# Patient Record
Sex: MALE | Race: BLACK OR AFRICAN AMERICAN | Employment: UNEMPLOYED | URBAN - METROPOLITAN AREA
[De-identification: names, ages, dates, MRNs, and addresses within clinical notes are randomized per-mention and may not be internally consistent; named-entity substitution may affect disease eponyms.]

---

## 2019-01-09 ENCOUNTER — HOSPITAL ENCOUNTER (OUTPATIENT)
Dept: PHYSICAL THERAPY | Age: 59
Discharge: HOME | End: 2019-01-09
Payer: COMMERCIAL

## 2019-01-09 PROCEDURE — 97110 THERAPEUTIC EXERCISES: CPT

## 2019-01-09 PROCEDURE — 97161 PT EVAL LOW COMPLEX 20 MIN: CPT
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
## 2019-01-16 ENCOUNTER — HOSPITAL ENCOUNTER (OUTPATIENT)
Dept: PHYSICAL THERAPY | Age: 59
Discharge: HOME | End: 2019-01-16
Payer: COMMERCIAL

## 2019-01-16 PROCEDURE — 97014 ELECTRIC STIMULATION THERAPY: CPT | Performed by: PHYSICAL THERAPIST

## 2019-01-16 PROCEDURE — 97110 THERAPEUTIC EXERCISES: CPT | Performed by: PHYSICAL THERAPIST

## 2019-01-16 NOTE — PROGRESS NOTES
[facility]  [address], [address] [facility], [place]    OUTPATIENT physical Therapy DAILY Treatment NOTe  Visit: 2    NAME: Christoph Pinto AGE: 66 y.o. GENDER: male  DATE: 1/16/2019  REFERRING PHYSICIAN: Gabriel Crespo MD      GOALS  Short term goals  Time frame: 3 weeks  1. Patient will be compliant and independent with the initial HEP as evidenced by being able to perform without cuing. 2. Patient will report a 50% improvement in symptoms. 3. Patient report a 50% improvement in sleeping. 4. Patient will tolerate 15 minutes of clinic activities before onset of symptoms.      Long term goals  Time frame: 6 weeks  1. Patient will report pain level decrease to 2/10 to allow increased ease of movement. 2. Patient will have an improved score on the TXU Javan outcome measure by 12 points to demonstrate an increase in functional activity tolerance. 3. Patient will be independent in final individualized HEP. 4. Patient will sleep 6-8 hours without being interrupted by pain. SUBJECTIVE:   \"It's so painful. \"    Patient 15 minutes late to session. Pain In: 7-8/10 low back, neck, buttocks    OBJECTIVE DATA SUMMARY:   Objective data from initial evaluation:  Pain:  Location: Low back, neck and left shoulder and buttocks  Quality: aching, burning and throbbing  Now: 7/10  Worst: 10/10  Factors that improve pain: medication: Ibuprofen, Motrin used and beneficial     Posture:   Pt holds himself very stiffly     Strength:   Not tested d/t pain     Range of Motion:   Trunk ROM  Flexion 25% of normal  Extension 25% of normal  SB B/L 10%     Neck ROM  Flexion/Extension 10% of normal  Rotation 10%     Spinal Assessment:   Flattened lumbar spine        Joint Mobility:   Very TTP over spinous processes lumbar spine, B/L PSIS,     Palpation:    Left gluteal muscles. B/L lumbar paraspinals,B/L UT,Rhomboids    Neurologic Assessment:               Tone:  WNL               Sensation: normal Reflexes: not tested     Special Tests:   Not tested     Mobility:               Transitional Movements: Antalgic                Gait: antalgic     Balance: WNL     Functional Measure:   Ros Watsonashia 17/24     TREATMENT/INTERVENTION:  Modalities (Rationale): to decrease pain and muscle guarding  MHP and e stim to bilateral neck and low back for 15 minutes in supine at end of session; no skin irritation noted     Therapeutic Exercises:  HEP: Cx ROM, Shoulder shrug, Flexion stretch over table, KTC, PPT, LTR, shoulder flexion using cane    Clinic exercises in BOLD performed this date:    Shoulder shrugs: 10 reps  Cervical spine AROM: 10 reps  Shoulder flexion with cane in supine  Flexion stretch over table: 5 reps    SKTC: 3 reps  PPT; unable to tolerate  LTR: 10 reps   Piriformis stretch: 2 reps with 30 second holds B; required assistance     Manual Therapy:  none     Neuro Re-Education:  Encouraged to wean for soft cervical collar    Activity tolerance and post treatment pain report:   Poor  Pain Out: 7/10    Education:  Education was provided to the patient on the following topics:.  []    No changes were made to the home exercise program.  [x]    The following changes were made to the home exercise program: Continue to encourage patient to wean for cervical soft collar, continue HEP, and perform self massage in shower to neck musculature  Patient verbalized understanding of the topics presented. ASSESSMENT:   Patient returns following initial evaluation. Patient presents with 7-8/10 pain in low back and neck. He reports pain in bilateral buttocks and brought his own cushion for when he is sitting and laying down. Pain felt more stretch on right with piriformis stretch. Patient arrived to clinic in soft cervical collar again; encouraged patient to wean from wearing this as it can inhibit ROM and prolong pain. Patient very slow and guarded with movements in clinic.  Occasional intense headaches reported. Patient with minimal tolerance of clinic exercises. Limited with adding to program as patient was 15 minutes late to session. Electrical stimulation trial performed to bilateral UT and lumbar paraspinals today; patient with pain relief following e stim. Patients progression toward goals is as follows:  [x]     Improving appropriately and progressing toward goals  []     Improving slowly and progressing toward goals  []     Not making progress toward goals and plan of care will be adjusted    PLAN OF CARE:   Patient continues to benefit from skilled intervention to address the above impairments. [x]    Continue treatment per established plan of care.   []     Recommend the following changes to the plan of care    Recommendations/Intent for next treatment: reassess response to e stim and see if he has come out of soft cervical collar    Mikey Gautam, PT   Time Calculation: 30 mins  Patient Time in clinic:   Start Time: 1345   Stop Time: [id number] [phone]

## 2019-01-22 ENCOUNTER — HOSPITAL ENCOUNTER (OUTPATIENT)
Dept: PHYSICAL THERAPY | Age: 59
Discharge: HOME | End: 2019-01-22
Payer: COMMERCIAL

## 2019-01-22 PROCEDURE — 97110 THERAPEUTIC EXERCISES: CPT

## 2019-01-22 PROCEDURE — 97014 ELECTRIC STIMULATION THERAPY: CPT

## 2019-01-22 NOTE — PROGRESS NOTES
[facility]  [address], [address] [facility]    OUTPATIENT physical Therapy DAILY Treatment NOTe  Visit: 3    NAME: Eliseo Olivares AGE: 66 y.o. GENDER: male  DATE: 1/22/2019  REFERRING PHYSICIAN: Marcia Colon MD      GOALS  Short term goals  Time frame: 3 weeks  1. Patient will be compliant and independent with the initial HEP as evidenced by being able to perform without cuing. 2. Patient will report a 50% improvement in symptoms. 3. Patient report a 50% improvement in sleeping. 4. Patient will tolerate 15 minutes of clinic activities before onset of symptoms.      Long term goals  Time frame: 6 weeks  1. Patient will report pain level decrease to 2/10 to allow increased ease of movement. 2. Patient will have an improved score on the TXU Javan outcome measure by 12 points to demonstrate an increase in functional activity tolerance. 3. Patient will be independent in final individualized HEP. 4. Patient will sleep 6-8 hours without being interrupted by pain. SUBJECTIVE:   \"It's so painful. \"  Pt arrives wearing soft collar  Patient 10  minutes late to session. Pain In: 8/10 low back, neck, buttocks. It depends on my position. .    Eye irritation has been going to Dr. Reyes. that  OBJECTIVE DATA SUMMARY:   Objective data from initial evaluation:  Pain:  Location: Low back, neck and left shoulder and buttocks  Quality: aching, burning and throbbing  Now: 7/10  Worst: 10/10  Factors that improve pain: medication: Ibuprofen, Motrin used and beneficial     Posture:   Pt holds himself very stiffly     Strength:   Not tested d/t pain     Range of Motion:   Trunk ROM  Flexion 25% of normal  Extension 25% of normal  SB B/L 10%     Neck ROM  Flexion/Extension 10% of normal  Rotation 10%     Spinal Assessment:   Flattened lumbar spine        Joint Mobility:   Very TTP over spinous processes lumbar spine, B/L PSIS,     Palpation:    Left gluteal muscles.  B/L lumbar paraspinals,B/L UT,Rhomboids    Neurologic Assessment:               Tone: WNL               Sensation: normal               Reflexes: not tested     Special Tests:   Not tested     Mobility:               Transitional Movements: Antalgic                Gait: antalgic     Balance: WNL     Functional Measure:   TXU Javan 17/24     TREATMENT/INTERVENTION:  Modalities (Rationale): to decrease pain and muscle guarding  MHP and e stim to bilateral neck and low back for 15 minutes in supine at end of session; no skin irritation noted     Therapeutic Exercises:  HEP: Cx ROM, Shoulder shrug, Flexion stretch over table, KTC, PPT, LTR, shoulder flexion using cane    Clinic exercises in BOLD performed this date:    Shoulder shrugs: 10 reps  Cervical spine AROM: 10 reps  Flexion stretch over Blue ball : 5 reps    SKTC: 8 reps  PPT; 1 rep  LTR: 10 reps   Piriformis stretch: 2 reps with 30 second holds B; required assistance    Standing   Rows with red TB x 8 reps  Pull downs with red TB 6 reps    LBE x 2 minutes, very slow rotation, unable to use UBE d/t pain     Manual Therapy:  none     Neuro Re-Education:  Encouraged to wean for soft cervical collar    Activity tolerance and post treatment pain report:   Poor  Pain Out: 7/10    Education:  Education was provided to the patient on the following topics:.  []    No changes were made to the home exercise program.  [x]    The following changes were made to the home exercise program: Continue to encourage patient to wean for cervical soft collar, continue HEP, and perform self massage in shower to neck musculature  Patient verbalized understanding of the topics presented. ASSESSMENT:    Patient presents with 8/10 pain in low back and neck. He reports pain in bilateral buttocks and brought his own cushion for when he is sitting and laying down.   Patient arrived to clinic in soft cervical collar again; encouraged patient to wean from wearing this as it can inhibit ROM and prolong pain. All movements in clinic very slow and guarded, unable to complete exercises, or advance. Pt clinching jaw during all exercises, discussed with pt the damage that can occur to teeth and TMJ with clinching and suggested alternate ways to deal with pain. Electrical stimulation trial performed to bilateral UT and lumbar paraspinals today; patient with pain relief following e stim. Patients progression toward goals is as follows:  [x]     Improving appropriately and progressing toward goals  []     Improving slowly and progressing toward goals  []     Not making progress toward goals and plan of care will be adjusted    PLAN OF CARE:   Patient continues to benefit from skilled intervention to address the above impairments. [x]    Continue treatment per established plan of care. []     Recommend the following changes to the plan of care    Recommendations/Intent for next treatment: reassess response to e stim and see if he has come out of soft cervical collar.   JAYNA Mckee PT   Time Calculation: 50 mins  Patient Time in clinic:   Start Time: 1400   Stop Time: [phone]

## 2019-01-23 ENCOUNTER — HOSPITAL ENCOUNTER (OUTPATIENT)
Dept: PHYSICAL THERAPY | Age: 59
Discharge: HOME | End: 2019-01-23
Payer: COMMERCIAL

## 2019-01-23 PROCEDURE — 97110 THERAPEUTIC EXERCISES: CPT

## 2019-01-23 NOTE — PROGRESS NOTES
[facility]  [address], [address] [facility]    OUTPATIENT physical Therapy DAILY Treatment NOTe  Visit: 4    NAME: Essence Rodgers AGE: 66 y.o. GENDER: male  DATE: 1/23/2019  REFERRING PHYSICIAN: Eileen Brunson MD      GOALS  Short term goals  Time frame: 3 weeks  1. Patient will be compliant and independent with the initial HEP as evidenced by being able to perform without cuing. 2. Patient will report a 50% improvement in symptoms. 3. Patient report a 50% improvement in sleeping. 4. Patient will tolerate 15 minutes of clinic activities before onset of symptoms.      Long term goals  Time frame: 6 weeks  1. Patient will report pain level decrease to 2/10 to allow increased ease of movement. 2. Patient will have an improved score on the TXU Javan outcome measure by 12 points to demonstrate an increase in functional activity tolerance. 3. Patient will be independent in final individualized HEP. 4. Patient will sleep 6-8 hours without being interrupted by pain. SUBJECTIVE:   \"It's so painful. \"  Pt arrives wearing soft collar  Patient 10  minutes late to session. Pain In: 9/10 low back, neck, shoulders,, buttocks. It depends on my position. .    Eye irritation has been going to  [facility]. that  OBJECTIVE DATA SUMMARY:   Objective data from initial evaluation:  Pain:  Location: Low back, neck and left shoulder and buttocks  Quality: aching, burning and throbbing  Now: 7/10  Worst: 10/10  Factors that improve pain: medication: Ibuprofen, Motrin used and beneficial     Posture:   Pt holds himself very stiffly     Strength:   Not tested d/t pain     Range of Motion:   Trunk ROM  Flexion 25% of normal  Extension 25% of normal  SB B/L 10%     Neck ROM  Flexion/Extension 10% of normal  Rotation 10%     Spinal Assessment:   Flattened lumbar spine        Joint Mobility:   Very TTP over spinous processes lumbar spine, B/L PSIS,     Palpation:    Left gluteal muscles.  B/L lumbar paraspinals,B/L UT,Rhomboids    Neurologic Assessment:               Tone: WNL               Sensation: normal               Reflexes: not tested     Special Tests:   Not tested     Mobility:               Transitional Movements: Antalgic                Gait: antalgic     Balance: WNL     Functional Measure:   Janna Mobile      TREATMENT/INTERVENTION:  Modalities (Rationale): to decrease pain and muscle guarding  MHP and e stim to bilateral neck and low back for 15 minutes in supine at end of session; no skin irritation noted     Therapeutic Exercises:  HEP: Cx ROM, Shoulder shrug, Flexion stretch over table, KTC, PPT, LTR, shoulder flexion using cane    Clinic exercises in BOLD performed this date:    Shoulder shrugs: 10 reps  Shoulder blade squeeze 5 reps  Cervical spine AROM: 10 reps  Flexion stretch over Blue ball : 5 reps  SB with arms across chest, vertical towel roll, push into towel with 10 sec hold    SKTC: 8 reps  PPT; 1 rep  LTR: 10 reps   Piriformis stretch: 2 reps with 30 second holds B; required assistance  Adductor stretch 2 reps 15 sec hold    Standing   Rows with red TB x 10 reps  Pull downs with red TB  10 reps    LBE/UBE x 2 minutes, very slow rotation,     Manual Therapy:  none     Neuro Re-Education:  Encouraged to wean for soft cervical collar    Activity tolerance and post treatment pain report:   Poor  Pain Out: 7/10    Education:  Education was provided to the patient on the following topics:.  []    No changes were made to the home exercise program.  [x]    The following changes were made to the home exercise program: Continue to encourage patient to wean for cervical soft collar, continue HEP, and perform self massage in shower to neck musculature  Patient verbalized understanding of the topics presented. ASSESSMENT:    Patient presents with 9/10 pain in low back and neck.  He reports pain in bilateral buttocks and brought his own cushion for when he is sitting and laying down.  Patient arrived to clinic in soft cervical collar again; encouraged patient to wean from wearing this as it can inhibit ROM and prolong pain. All movements in clinic very slow and guarded, unable to complete exercises, or advance. Pt clinching jaw during all exercises, discussed with pt the damage that can occur to teeth and TMJ with clinching and suggested alternate ways to deal with pain. Minimally advanced clinic activities as tolerated    Patients progression toward goals is as follows:  [x]     Improving appropriately and progressing toward goals  []     Improving slowly and progressing toward goals  []     Not making progress toward goals and plan of care will be adjusted    PLAN OF CARE:   Patient continues to benefit from skilled intervention to address the above impairments. [x]    Continue treatment per established plan of care. []     Recommend the following changes to the plan of care    Recommendations/Intent for next treatment:TM, advance as able.     Ivett Addison PT   Time Calculation: 40 mins  Patient Time in clinic:   Start Time: 1400   Stop Time: 1440

## 2019-01-23 NOTE — PROGRESS NOTES
Dear Dr Alcala Began,    Mr Grover Hammans has been for 4 Physical Therapy sessions. His ability to participate has been extremely limited due to reported pain with all movements. We have focused on gentle stretches for his neck and back, and exercises to increase his mobility. We have encouraged him to wean from the soft collar in an effort to encourage a more normal movement pattern and to begin strengthening the cervical muscles. While doing the exercises in the clinic he has been clinching his jaw and grinding his teeth, we have discussed with him the benefit of relaxing his jaw to prevent damage to his teeth and TMJ.       Thank you for this referral,    Rosemarie Burns, PT

## 2019-01-28 ENCOUNTER — HOSPITAL ENCOUNTER (OUTPATIENT)
Dept: PHYSICAL THERAPY | Age: 59
Discharge: HOME | End: 2019-01-28
Payer: COMMERCIAL

## 2019-01-28 PROCEDURE — 97014 ELECTRIC STIMULATION THERAPY: CPT

## 2019-01-28 PROCEDURE — 97110 THERAPEUTIC EXERCISES: CPT

## 2019-01-28 NOTE — PROGRESS NOTES
[facility]  [address], [address]    OUTPATIENT physical Therapy DAILY Treatment NOTe  Visit: 5    NAME: Danilo Arthur AGE: 66 y.o. GENDER: male  DATE: 1/28/2019  REFERRING PHYSICIAN: Lorena Velasquez MD      GOALS  Short term goals  Time frame: 3 weeks  1. Patient will be compliant and independent with the initial HEP as evidenced by being able to perform without cuing. 2. Patient will report a 50% improvement in symptoms. 3. Patient report a 50% improvement in sleeping. 4. Patient will tolerate 15 minutes of clinic activities before onset of symptoms.      Long term goals  Time frame: 6 weeks  1. Patient will report pain level decrease to 2/10 to allow increased ease of movement. 2. Patient will have an improved score on the TXU Javan outcome measure by 12 points to demonstrate an increase in functional activity tolerance. 3. Patient will be independent in final individualized HEP. 4. Patient will sleep 6-8 hours without being interrupted by pain. SUBJECTIVE:   Pt saw Dr Rodriguez Mcclellan, he prescribed Percocet. I think the med helps me be able to do the exercise    Took 1/2 pill prior to Therapy today. OBJECTIVE DATA SUMMARY:   Objective data from initial evaluation:  Pain:  Location: Low back, neck and left shoulder and buttocks  Quality: aching, burning and throbbing  Now: 7/10  Worst: 10/10  Factors that improve pain: medication: Ibuprofen, Motrin used and beneficial     Posture:   Pt holds himself very stiffly     Strength:   Not tested d/t pain     Range of Motion:   Trunk ROM  Flexion 25% of normal  Extension 25% of normal  SB B/L 10%     Neck ROM  Flexion/Extension 10% of normal  Rotation 10%     Spinal Assessment:   Flattened lumbar spine        Joint Mobility:   Very TTP over spinous processes lumbar spine, B/L PSIS,     Palpation:    Left gluteal muscles. B/L lumbar paraspinals,B/L UT,Rhomboids    Neurologic Assessment:               Tone:  WNL Sensation: normal               Reflexes: not tested     Special Tests:   Not tested     Mobility:               Transitional Movements: Antalgic                Gait: antalgic     Balance: WNL     Functional Measure:   Wendy Alaniz 17/24     TREATMENT/INTERVENTION:  Modalities (Rationale): to decrease pain and muscle guarding  MHP and e stim to bilateral neck and low back for 15 minutes in supine at end of session; no skin irritation noted     Therapeutic Exercises:  HEP: Cx ROM, Shoulder shrug, Flexion stretch over table, KTC, PPT, LTR, shoulder flexion using cane    Clinic exercises in BOLD performed this date:    Shoulder shrugs: 10 reps  Shoulder blade squeeze 5 reps  Cervical spine AROM: 10 reps  Flexion stretch over Blue ball : 5 reps  SB with arms across chest, vertical towel roll, push into towel with 10 sec hold    SKTC: 8 reps  PPT; 1 rep  LTR: 10 reps   Piriformis stretch: 2 reps with 30 second holds B; required assistance  Adductor stretch 2 reps 15 sec hold  Passive HS stretch 3 reps with 15 sec hold. Standing   Rows with red TB x 10 reps  Pull downs with red TB  10 reps  Hip abduction and extension 7 reps each direction B/L    LBE/UBE x 2 minutes, very slow rotation,      Manual Therapy:  Grade 1 IASTM to B/L UT, levator. Procedure explained to pt and he expressed understanding.     Neuro Re-Education:  Encouraged to wean for soft cervical collar    Activity tolerance and post treatment pain report:   Poor  Pain Out: 7/10    Education:  Education was provided to the patient on the following topics:.  []    No changes were made to the home exercise program.  [x]    The following changes were made to the home exercise program: Continue to encourage patient to wean for cervical soft collar, continue HEP, and perform self massage in shower to neck musculature  Patient verbalized understanding of the topics presented.       ASSESSMENT:    Patient presents with 9/10 pain in low back and neck. Pt saw Dr Manjit Macias on Thursday, started on Percocet. Pt took 1/2 pill prior to Therapy this date, minimum improvement in abilty to perform exercises. He reports pain in bilateral buttocks and brought his own cushion for when he is sitting and laying down. Patient arrived to clinic in soft cervical collar again; encouraged patient to wean from wearing this as it can inhibit ROM and prolong pain. All movements in clinic very slow and guarded, advanced minimally as above. Patients progression toward goals is as follows:  [x]     Improving appropriately and progressing toward goals  []     Improving slowly and progressing toward goals  []     Not making progress toward goals and plan of care will be adjusted    PLAN OF CARE:   Patient continues to benefit from skilled intervention to address the above impairments. [x]    Continue treatment per established plan of care.   []     Recommend the following changes to the plan of care    Recommendations/Intent for next treatment:TM, advance as able, assess response to IASTM    Pa Rivera PT   Time Calculation: 55 mins  Patient Time in clinic:   Start Time: 1325   Stop Time: [id number]

## 2019-01-30 ENCOUNTER — HOSPITAL ENCOUNTER (OUTPATIENT)
Dept: PHYSICAL THERAPY | Age: 59
Discharge: HOME | End: 2019-01-30
Payer: COMMERCIAL

## 2019-01-30 PROCEDURE — 97014 ELECTRIC STIMULATION THERAPY: CPT

## 2019-01-30 PROCEDURE — 97110 THERAPEUTIC EXERCISES: CPT

## 2019-01-30 NOTE — PROGRESS NOTES
[facility]  [address], [address] [facility]    OUTPATIENT physical Therapy DAILY Treatment NOTe  Visit: 6    NAME: Bren Villa AGE: 66 y.o. GENDER: male  DATE: 1/30/2019  REFERRING PHYSICIAN: Karine Dumont MD      GOALS  Short term goals  Time frame: 3 weeks  1. Patient will be compliant and independent with the initial HEP as evidenced by being able to perform without cuing. 2. Patient will report a 50% improvement in symptoms. 3. Patient report a 50% improvement in sleeping. 4. Patient will tolerate 15 minutes of clinic activities before onset of symptoms.      Long term goals  Time frame: 6 weeks  1. Patient will report pain level decrease to 2/10 to allow increased ease of movement. 2. Patient will have an improved score on the TXU Javan outcome measure by 12 points to demonstrate an increase in functional activity tolerance. 3. Patient will be independent in final individualized HEP. 4. Patient will sleep 6-8 hours without being interrupted by pain. SUBJECTIVE:   Pt arrives wearing collar. 20 minutes late for Apt. Took 2 percocet this date. OBJECTIVE DATA SUMMARY:   Objective data from initial evaluation:  Pain:  Location: Low back, neck and left shoulder and buttocks  Quality: aching, burning and throbbing  Now: 7/10  Worst: 10/10  Factors that improve pain: medication: Ibuprofen, Motrin used and beneficial     Posture:   Pt holds himself very stiffly     Strength:   Not tested d/t pain     Range of Motion:   Trunk ROM  Flexion 25% of normal  Extension 25% of normal  SB B/L 10%     Neck ROM  Flexion/Extension 10% of normal  Rotation 10%     Spinal Assessment:   Flattened lumbar spine        Joint Mobility:   Very TTP over spinous processes lumbar spine, B/L PSIS,     Palpation:    Left gluteal muscles. B/L lumbar paraspinals,B/L UT,Rhomboids    Neurologic Assessment:               Tone:  WNL               Sensation: normal               Reflexes: not tested     Special Tests:   Not tested     Mobility:               Transitional Movements: Antalgic                Gait: antalgic     Balance: WNL     Functional Measure:   Reshma Puentes 17/24     TREATMENT/INTERVENTION:  Modalities (Rationale): to decrease pain and muscle guarding  MHP and e stim to bilateral neck and low back for 15 minutes in supine at end of session; no skin irritation noted     Therapeutic Exercises:  HEP: Cx ROM, Shoulder shrug, Flexion stretch over table, KTC, PPT, LTR, shoulder flexion using cane    Clinic exercises in BOLD performed this date:    Shoulder shrugs: 10 reps  Shoulder blade squeeze 5 reps  Cervical spine AROM: 10 reps  Flexion stretch over Blue ball : 5 reps  SB with arms across chest, vertical towel roll, push into towel with 10 sec hold    SKTC: 8 reps  PPT; 1 rep  LTR: 10 reps   Piriformis stretch: 2 reps with 30 second holds B; required assistance  Adductor stretch 2 reps 15 sec hold  Passive HS stretch 3 reps with 15 sec hold. Standing   Rows with red TB x 10 reps  Pull downs with red TB  10 reps  Hip abduction and extension 7 reps each direction B/L    LBE/UBE x 3 minutes, very slow rotation,      Manual Therapy:  Grade 1 IASTM to B/L UT, levator. Procedure explained to pt and he expressed understanding.     Neuro Re-Education:  Encouraged to wean for soft cervical collar    Activity tolerance and post treatment pain report:   Poor  Pain Out: 7/10    Education:  Education was provided to the patient on the following topics:.  []    No changes were made to the home exercise program.  [x]    The following changes were made to the home exercise program: Continue to encourage patient to wean for cervical soft collar, continue HEP, and perform self massage in shower to neck musculature  Patient verbalized understanding of the topics presented. ASSESSMENT:   Pt 20 minutes late for apt, therefore limited time for treatment session. Sobeida Rosario   He reports that he took 2 percocets prior to coming to Therapy and feels like he will be able to participate. Patient arrived to clinic in soft cervical collar again; encouraged patient to wean from wearing this as it can inhibit ROM and prolong pain. All movements in clinic very slow and guarded, advanced as able. Patients progression toward goals is as follows:  [x]     Improving appropriately and progressing toward goals  []     Improving slowly and progressing toward goals  []     Not making progress toward goals and plan of care will be adjusted    PLAN OF CARE:   Patient continues to benefit from skilled intervention to address the above impairments. [x]    Continue treatment per established plan of care.   []     Recommend the following changes to the plan of care    Recommendations/Intent for next treatment:TM, advance as able, IASTM    Carlo Ramos, PT   Time Calculation: 30 mins  Patient Time in clinic:   Start Time: 2201   Stop Time: 0156

## 2019-02-05 ENCOUNTER — HOSPITAL ENCOUNTER (OUTPATIENT)
Dept: PHYSICAL THERAPY | Age: 59
Discharge: HOME | End: 2019-02-05
Payer: COMMERCIAL

## 2019-02-05 PROCEDURE — 97110 THERAPEUTIC EXERCISES: CPT

## 2019-02-05 PROCEDURE — 97014 ELECTRIC STIMULATION THERAPY: CPT

## 2019-02-05 NOTE — PROGRESS NOTES
[facility]  [address], [address] [facility]    OUTPATIENT physical Therapy DAILY Treatment NOTe  Visit: 7    NAME: Carmela Leon AGE: 66 y.o. GENDER: male  DATE: 2/5/2019  REFERRING PHYSICIAN: Christiane Shanks MD      GOALS  Short term goals  Time frame: 3 weeks  1. Patient will be compliant and independent with the initial HEP as evidenced by being able to perform without cuing. 2. Patient will report a 50% improvement in symptoms. 3. Patient report a 50% improvement in sleeping. 4. Patient will tolerate 15 minutes of clinic activities before onset of symptoms.      Long term goals  Time frame: 6 weeks  1. Patient will report pain level decrease to 2/10 to allow increased ease of movement. 2. Patient will have an improved score on the TXU Javan outcome measure by 12 points to demonstrate an increase in functional activity tolerance. 3. Patient will be independent in final individualized HEP. 4. Patient will sleep 6-8 hours without being interrupted by pain. SUBJECTIVE:   Pt arrives wearing collar. My neck has been hurting for a few days, I took 3 percocets today    Neck 9.5/10  Back 8/10    OBJECTIVE DATA SUMMARY:   Objective data from initial evaluation:  Pain:  Location: Low back, neck and left shoulder and buttocks  Quality: aching, burning and throbbing  Now: 7/10  Worst: 10/10  Factors that improve pain: medication: Ibuprofen, Motrin used and beneficial     Posture:   Pt holds himself very stiffly     Strength:   Not tested d/t pain     Range of Motion:   Trunk ROM  Flexion 25% of normal  Extension 25% of normal  SB B/L 10%     Neck ROM  Flexion/Extension 10% of normal  Rotation 10%     Spinal Assessment:   Flattened lumbar spine        Joint Mobility:   Very TTP over spinous processes lumbar spine, B/L PSIS,     Palpation:    Left gluteal muscles. B/L lumbar paraspinals,B/L UT,Rhomboids    Neurologic Assessment:               Tone:  WNL Sensation: normal               Reflexes: not tested     Special Tests:   Not tested     Mobility:               Transitional Movements: Antalgic                Gait: antalgic     Balance: WNL     Functional Measure:   Amanda Bronson 17/24     TREATMENT/INTERVENTION:  Modalities (Rationale): to decrease pain and muscle guarding  MHP and e stim to bilateral neck and low back for 15 minutes in supine at end of session; no skin irritation noted     Therapeutic Exercises:  HEP: Cx ROM, Shoulder shrug, Flexion stretch over table, KTC, PPT, LTR, shoulder flexion using cane    Clinic exercises in BOLD performed this date:    Shoulder shrugs: 10 reps  Shoulder blade squeeze 5 reps  Cervical spine AROM: 10 reps  Flexion stretch over Blue ball : 5 reps  SB with arms across chest, vertical towel roll, push into towel with 10 sec hold    SKTC: 8 reps  PPT; 1 rep  LTR: 10 reps   Piriformis stretch: 2 reps with 30 second holds B; required assistance  Adductor stretch 2 reps 15 sec hold  Passive HS stretch 3 reps with 15 sec hold. G-H Flexion cane x 5 reps    Standing   Rows with red TB x 10 reps  Pull downs with red TB  10 reps  Hip abduction and extension 7 reps each direction B/L    LBE/UBE x 3 minutes, very slow rotation,   TM x 2 min speed . 2     Manual Therapy:  Grade 1 IASTM to B/L UT, levator. Procedure explained to pt and he expressed understanding.     Neuro Re-Education:  Encouraged to wean for soft cervical collar    Activity tolerance and post treatment pain report:   Poor  Pain Out: 7/10    Education:  Education was provided to the patient on the following topics:.  []    No changes were made to the home exercise program.  [x]    The following changes were made to the home exercise program: Continue to encourage patient to wean for cervical soft collar, continue HEP, and perform self massage in shower to neck musculature  Patient verbalized understanding of the topics presented.       ASSESSMENT:    Patient arrived to clinic in soft cervical collar again; encouraged patient to wean from wearing this as it can inhibit ROM and prolong pain. Pt reports that taking the percocet has decreased his pain but continues with very slow guarded movements in clinic. Advance activities and soft tissue treatment as able. Patients progression toward goals is as follows:  [x]     Improving appropriately and progressing toward goals  []     Improving slowly and progressing toward goals  []     Not making progress toward goals and plan of care will be adjusted    PLAN OF CARE:   Patient continues to benefit from skilled intervention to address the above impairments. [x]    Continue treatment per established plan of care.   []     Recommend the following changes to the plan of care    Recommendations/Intent for next treatment:TM, advance as able, [facility]    Pa Rivera, PT   Time Calculation: 50 mins  Patient Time in clinic:   Start Time: 1430   Stop Time: [phone]

## 2019-02-07 ENCOUNTER — HOSPITAL ENCOUNTER (OUTPATIENT)
Dept: PHYSICAL THERAPY | Age: 59
Discharge: HOME | End: 2019-02-07
Payer: COMMERCIAL

## 2019-02-07 PROCEDURE — 97014 ELECTRIC STIMULATION THERAPY: CPT | Performed by: PHYSICAL THERAPIST

## 2019-02-07 PROCEDURE — 97110 THERAPEUTIC EXERCISES: CPT | Performed by: PHYSICAL THERAPIST

## 2019-02-07 NOTE — PROGRESS NOTES
[facility]  [address], [address] [facility]    OUTPATIENT physical Therapy DAILY Treatment NOTe  Visit: 8    NAME: Rosaline Ledesma AGE: 66 y.o. GENDER: male  DATE: 2/7/2019  REFERRING PHYSICIAN: Carol Ann Mac MD      GOALS  Short term goals  Time frame: 3 weeks  1. Patient will be compliant and independent with the initial HEP as evidenced by being able to perform without cuing. 2. Patient will report a 50% improvement in symptoms. 3. Patient report a 50% improvement in sleeping. 4. Patient will tolerate 15 minutes of clinic activities before onset of symptoms.      Long term goals  Time frame: 6 weeks  1. Patient will report pain level decrease to 2/10 to allow increased ease of movement. 2. Patient will have an improved score on the TXU Javan outcome measure by 12 points to demonstrate an increase in functional activity tolerance. 3. Patient will be independent in final individualized HEP. 4. Patient will sleep 6-8 hours without being interrupted by pain. SUBJECTIVE:   \"I feel better today. \"    Patient continues to arrive wearing soft cervical collar. Pain in: Neck 7.5/10; Back 7/10    OBJECTIVE DATA SUMMARY:   Objective data from initial evaluation:  Pain:  Location: Low back, neck and left shoulder and buttocks  Quality: aching, burning and throbbing  Now: 7/10  Worst: 10/10  Factors that improve pain: medication: Ibuprofen, Motrin used and beneficial     Posture:   Pt holds himself very stiffly     Strength:   Not tested d/t pain     Range of Motion:   Trunk ROM  Flexion 25% of normal  Extension 25% of normal  SB B/L 10%     Neck ROM  Flexion/Extension 10% of normal  Rotation 10%     Spinal Assessment:   Flattened lumbar spine        Joint Mobility:   Very TTP over spinous processes lumbar spine, B/L PSIS,     Palpation:    Left gluteal muscles. B/L lumbar paraspinals,B/L UT,Rhomboids    Neurologic Assessment:               Tone:  WNL Sensation: normal               Reflexes: not tested     Special Tests:   Not tested     Mobility:               Transitional Movements: Antalgic                Gait: antalgic     Balance: WNL     Functional Measure:   TXU Javan 17/24     TREATMENT/INTERVENTION:  Modalities (Rationale): to decrease pain and muscle guarding  MHP and e stim to bilateral neck and low back for 15 minutes in prone at end of session; no skin irritation noted     Therapeutic Exercises:  HEP: Cx ROM, Shoulder shrug, Flexion stretch over table, KTC, PPT, LTR, shoulder flexion using cane    Clinic exercises in BOLD performed this date:    Shoulder shrugs: 10 reps  Shoulder blade squeeze 5 reps  Cervical spine AROM rotation and side bend: 10 reps  Flexion stretch over Blue ball : 2 sets of 5 reps  Seated trunk rotation with contralateral cervical rotation: 5 reps each side  SB with arms across chest, vertical towel roll, push into towel with 10 sec hold    SKTC: 5 reps B  PPT: 10 rep  LTR: 10 reps   Piriformis stretch: 2 reps with 30 second holds B; required assistance  Adductor stretch 2 reps 15 sec hold  Passive HS stretch 3 reps with 15 sec hold. G-H Flexion cane x 5 reps    Standing   Rows with red TB x 10 reps  Pull downs with red TB  10 reps  Hip abduction and extension 7 reps each direction B/L    LBE/UBE x 3 minutes, very slow rotation,   TM x 2 min speed . 2     Manual Therapy:  Grade 1 IASTM to B/L UT, levator. Procedure explained to pt and he expressed understanding.   Gentle STM to bilateral UT      Neuro Re-Education:  Encouraged to wean for soft cervical collar    Activity tolerance and post treatment pain report:   Fair/Poor  Pain Out: 6/10    Education:  Education was provided to the patient on the following topics:.  []    No changes were made to the home exercise program.  [x]    The following changes were made to the home exercise program: Continue to encourage patient to wean for cervical soft collar, continue HEP, and perform self massage in shower to neck musculature  Patient verbalized understanding of the topics presented. ASSESSMENT:   Patient presents with decreased pain in low back and neck from previous session. Patient continues to arrive to clinic in soft cervical collar. Educated on importance of weaning from soft collar as it can inhibit ROM and prolong pain. Patient continues to be hypersensitive to palpation at bilateral UT, levator scapulae, and rhomboids. He continues to be guarded with his movements in clinic. Increased rest break required throughout session. Advance activities and soft tissue treatment as able. He reports pain relief following e stim. Patients progression toward goals is as follows:  [x]     Improving appropriately and progressing toward goals  []     Improving slowly and progressing toward goals  []     Not making progress toward goals and plan of care will be adjusted    PLAN OF CARE:   Patient continues to benefit from skilled intervention to address the above impairments. [x]    Continue treatment per established plan of care.   []     Recommend the following changes to the plan of care    Recommendations/Intent for next treatment:TM, advance as able, IASTM    Chandler Ghotra, ALBA   Time Calculation: 45 mins  Patient Time in clinic:   Start Time: 9006   Stop Time: 1350

## 2019-02-12 ENCOUNTER — HOSPITAL ENCOUNTER (OUTPATIENT)
Dept: PHYSICAL THERAPY | Age: 59
Discharge: HOME | End: 2019-02-12
Payer: COMMERCIAL

## 2019-02-12 PROCEDURE — 97014 ELECTRIC STIMULATION THERAPY: CPT

## 2019-02-12 PROCEDURE — 97110 THERAPEUTIC EXERCISES: CPT
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
## 2019-02-19 ENCOUNTER — HOSPITAL ENCOUNTER (OUTPATIENT)
Dept: PHYSICAL THERAPY | Age: 59
Discharge: HOME | End: 2019-02-19
Payer: COMMERCIAL

## 2019-02-19 PROCEDURE — 97110 THERAPEUTIC EXERCISES: CPT

## 2019-02-19 PROCEDURE — 97014 ELECTRIC STIMULATION THERAPY: CPT

## 2019-02-19 NOTE — PROGRESS NOTES
[facility]  [address], [address] [facility]    OUTPATIENT physical Therapy DAILY Treatment NOTe  Visit: 10    NAME: Domenico Fine AGE: 66 y.o. GENDER: male  DATE: 2/19/2019  REFERRING PHYSICIAN: Allen Walton MD      GOALS  Short term goals  Time frame: 3 weeks  1. Patient will be compliant and independent with the initial HEP as evidenced by being able to perform without cuing. 2. Patient will report a 50% improvement in symptoms. 3. Patient report a 50% improvement in sleeping. 4. Patient will tolerate 15 minutes of clinic activities before onset of symptoms.      Long term goals  Time frame: 6 weeks  1. Patient will report pain level decrease to 2/10 to allow increased ease of movement. 2. Patient will have an improved score on the TXU Javan outcome measure by 12 points to demonstrate an increase in functional activity tolerance. 3. Patient will be independent in final individualized HEP. 4. Patient will sleep 6-8 hours without being interrupted by pain. SUBJECTIVE:   \"It is a little better. My fiance got me to ride the bike at home\"    Patient continues to arrive wearing soft cervical collar. Pain in: Neck 5/10; Back 5/10    OBJECTIVE DATA SUMMARY:   Objective data from initial evaluation:  Pain:  Location: Low back, neck and left shoulder and buttocks  Quality: aching, burning and throbbing  Now: 7/10  Worst: 10/10  Factors that improve pain: medication: Ibuprofen, Motrin used and beneficial     Posture:   Pt holds himself very stiffly     Strength:   Not tested d/t pain     Range of Motion:   Trunk ROM  Flexion 25% of normal  Extension 25% of normal  SB B/L 10%     Neck ROM  Flexion/Extension 10% of normal  Rotation 10%     Spinal Assessment:   Flattened lumbar spine        Joint Mobility:   Very TTP over spinous processes lumbar spine, B/L PSIS,     Palpation:    Left gluteal muscles.  B/L lumbar paraspinals,B/L UT,Rhomboids    Neurologic Assessment: Tone: WNL               Sensation: normal               Reflexes: not tested     Special Tests:   Not tested     Mobility:               Transitional Movements: Antalgic                Gait: antalgic     Balance: WNL     Functional Measure:   Martine Rose 17/24 2/19/2019 terence Srinivasan 18/24     TREATMENT/INTERVENTION:  Modalities (Rationale): to decrease pain and muscle guarding  MHP and e stim to bilateral neck and low back for 15 minutes in prone at end of session; no skin irritation noted     Therapeutic Exercises:  HEP: Cx ROM, Shoulder shrug, Flexion stretch over table, KTC, PPT, LTR, shoulder flexion using cane    Clinic exercises in BOLD performed this date:    Shoulder shrugs: 10 reps  Shoulder blade squeeze 5 reps  Cervical spine AROM rotation and side bend: 10 reps  Flexion stretch over Blue ball : 2 sets of 5 reps  Seated trunk rotation with contralateral cervical rotation: 5 reps each side  Seated trunk extension x 5 reps  SB with arms across chest, vertical towel roll, push into towel with 10 sec hold  Seated HS stretch 2 reps 30 sec hold    SKTC: 7 reps B  Bridges x 7 reps  LTR: 10 reps   Piriformis stretch: 2 reps with 30 second holds B; required assistance  Adductor stretch 2 reps 15 sec hold  Passive HS stretch 3 reps with 15 sec hold. G-H Flexion cane x 5 reps    Standing   Rows with blue  TB x 10 reps  Pull downs with blue TB  10 reps  Hip abduction and extension 10 reps each direction B/L    LBE/UBE x 2 minute forward, very slow rotation, 1 minute reverse  TM x 2 min speed . 2     Manual Therapy:  Grade 1 IASTM to B/L UT, levator, low tolerance  Procedure explained to pt and he expressed understanding.   Gentle STM to bilateral UT   STM lumbar paraspinals and left gluteal muscles using green weighted ball, very low tolerance     Neuro Re-Education:  Encouraged to wean for soft cervical collar    Activity tolerance and post treatment pain report:   Fair/Poor  Pain Out: 6/10    Education:  Education was provided to the patient on the following topics:.  []    No changes were made to the home exercise program.  [x]    The following changes were made to the home exercise program: Continue to encourage patient to wean for cervical soft collar, continue HEP, and perform self massage in shower to neck musculature  Patient verbalized understanding of the topics presented. ASSESSMENT:   Patient presents with decreased pain in low back and neck from previous session. 5/10 pain. Patient continues to arrive to clinic in soft cervical collar, continue to suggest that he wean from collar to allow improved ROM. Patient continues to be sensitive to palpation. IASTM trial this date assess response. Advance activities and soft tissue treatment as able. He reports pain relief following e stim. Patients progression toward goals is as follows:  [x]     Improving appropriately and progressing toward goals  []     Improving slowly and progressing toward goals  []     Not making progress toward goals and plan of care will be adjusted    PLAN OF CARE:   Patient continues to benefit from skilled intervention to address the above impairments. [x]    Continue treatment per established plan of care.   []     Recommend the following changes to the plan of care    Recommendations/Intent for next treatment:JAYNA, advance as able, IASJAYNA,    Alexander Disla, PT   Time Calculation: 55 mins  Patient Time in clinic:   Start Time: 1005   Stop Time: 1100

## 2019-02-21 ENCOUNTER — HOSPITAL ENCOUNTER (OUTPATIENT)
Dept: PHYSICAL THERAPY | Age: 59
Discharge: HOME | End: 2019-02-21
Payer: COMMERCIAL

## 2019-02-21 PROCEDURE — 97110 THERAPEUTIC EXERCISES: CPT | Performed by: PHYSICAL THERAPIST

## 2019-02-21 PROCEDURE — 97014 ELECTRIC STIMULATION THERAPY: CPT | Performed by: PHYSICAL THERAPIST

## 2019-02-21 NOTE — PROGRESS NOTES
[facility]  [address], [address] [facility]    OUTPATIENT physical Therapy DAILY Treatment NOTe  Visit: 11    NAME: Maty Ramos AGE: 66 y.o. GENDER: male  DATE: 2/21/2019  REFERRING PHYSICIAN: Gisele Wheat MD      GOALS  Short term goals  Time frame: 3 weeks  1. Patient will be compliant and independent with the initial HEP as evidenced by being able to perform without cuing. MET  2. Patient will report a 50% improvement in symptoms. NOT MET  3. Patient report a 50% improvement in sleeping. MET  4. Patient will tolerate 15 minutes of clinic activities before onset of symptoms. NOT MET     Long term goals  Time frame: 6 weeks  1. Patient will report pain level decrease to 2/10 to allow increased ease of movement. 2. Patient will have an improved score on the TXU Javan outcome measure by 12 points to demonstrate an increase in functional activity tolerance. 3. Patient will be independent in final individualized HEP. 4. Patient will sleep 6-8 hours without being interrupted by pain. SUBJECTIVE:   \"It's still sore\"    Patient continues to arrive wearing soft cervical collar. Pain in: Neck 5/10; Back 5/10    OBJECTIVE DATA SUMMARY:   Objective data from initial evaluation:  Pain:  Location: Low back, neck and left shoulder and buttocks  Quality: aching, burning and throbbing  Now: 7/10  Worst: 10/10  Factors that improve pain: medication: Ibuprofen, Motrin used and beneficial     Posture:   Pt holds himself very stiffly     Strength:   Not tested d/t pain     Range of Motion:   Trunk ROM  Flexion 25% of normal  Extension 25% of normal  SB B/L 10%     Neck ROM  Flexion/Extension 10% of normal  Rotation 10%     Spinal Assessment:   Flattened lumbar spine        Joint Mobility:   Very TTP over spinous processes lumbar spine, B/L PSIS,     Palpation:    Left gluteal muscles. B/L lumbar paraspinals,B/L UT,Rhomboids    Neurologic Assessment:               Tone:  WNL Sensation: normal               Reflexes: not tested     Special Tests:   Not tested     Mobility:               Transitional Movements: Antalgic                Gait: antalgic     Balance: WNL     Functional Measure:   Jorje Burkitt 17/24 2/19/2019 linda Srinivasan 18/24     TREATMENT/INTERVENTION:  Modalities (Rationale): to decrease pain and muscle guarding  MHP and e stim to bilateral neck and low back for 15 minutes in supine at end of session; no skin irritation noted     Therapeutic Exercises:  HEP: Cx ROM, Shoulder shrug, Flexion stretch over table, KTC, PPT, LTR, shoulder flexion using cane    Clinic exercises in BOLD performed this date:    Shoulder shrugs: 10 reps  Shoulder blade squeeze: 5 reps  Cervical spine AROM rotation and side bend: 10 reps  Flexion stretch over physioball: 10 reps  Seated trunk rotation: 5 reps each side  Seated trunk extension x 5 reps  SB with arms across chest, vertical towel roll, push into towel with 10 sec hold  Seated HS stretch 2 reps 30 sec hold    SKTC: 7 reps B  Bridges: 2 sets of 5 reps  LTR: 10 reps   Piriformis stretch with towel: 2 reps with 30 second holds B; required assistance   Adductor stretch 2 reps 15 sec hold  Passive HS stretch 3 reps with 15 sec hold. G-H Flexion cane x 5 reps    Standing   Rows with blue  TB x 10 reps  Pull downs with blue TB  10 reps  Hip abduction and extension 10 reps each direction B/L    LBE/UBE x 2 minute forward, very slow rotation, 1 minute reverse  TM x 2 min speed . 2     Manual Therapy:  Grade 1 IASTM to B/L UT, levator, low tolerance  Procedure explained to pt and he expressed understanding.   Gentle STM to bilateral UT   STM lumbar paraspinals and left gluteal muscles using green weighted ball, very low tolerance     Neuro Re-Education:  Encouraged to wean for soft cervical collar    Activity tolerance and post treatment pain report:   Fair/Poor; low tolerance to stretches and exercises  Pain Out: 4/10    Education:  Education was provided to the patient on the following topics:.  []    No changes were made to the home exercise program.  [x]    The following changes were made to the home exercise program: Continue to encourage patient to wean for cervical soft collar, continue HEP, and perform self massage in shower to neck musculature  Patient verbalized understanding of the topics presented. ASSESSMENT:   Patient presents with 5/10 pain in neck and low back. Patient continues to arrive to clinic in soft cervical collar despite encouragement to wean from collar altogether. Patient verbalizes his understanding of education that collar inhibits mobility which prolongs pain, but continues to wear it. He remains very cautious and slow with his movements. He continues to require verbal cues to increase hold with stretches. Patient continues to be sensitive to palpation. Advance activities and soft tissue treatment as able. He reports pain relief following e stim. Patients progression toward goals is as follows:  [x]     Improving appropriately and progressing toward goals  []     Improving slowly and progressing toward goals  []     Not making progress toward goals and plan of care will be adjusted    PLAN OF CARE:   Patient continues to benefit from skilled intervention to address the above impairments. [x]    Continue treatment per established plan of care.   []     Recommend the following changes to the plan of care    Recommendations/Intent for next treatment:COLIN DORANTES, PT   Time Calculation: 40 mins  Patient Time in clinic:   Start Time: [address]   Stop Time: 21

## 2019-02-24 ENCOUNTER — HOSPITAL ENCOUNTER (EMERGENCY)
Age: 59
Discharge: HOME | End: 2019-02-24
Attending: EMERGENCY MEDICINE | Admitting: EMERGENCY MEDICINE
Payer: COMMERCIAL

## 2019-02-24 VITALS
DIASTOLIC BLOOD PRESSURE: 77 MMHG | BODY MASS INDEX: 27.06 KG/M2 | OXYGEN SATURATION: 95 % | RESPIRATION RATE: 18 BRPM | WEIGHT: 178 LBS | HEART RATE: 72 BPM | SYSTOLIC BLOOD PRESSURE: 137 MMHG

## 2019-02-24 DIAGNOSIS — H10.31 ACUTE CONJUNCTIVITIS OF RIGHT EYE, UNSPECIFIED ACUTE CONJUNCTIVITIS TYPE: Primary | ICD-10-CM

## 2019-02-24 PROCEDURE — 74011250637 HC RX REV CODE- 250/637: Performed by: NURSE PRACTITIONER

## 2019-02-24 PROCEDURE — 99283 EMERGENCY DEPT VISIT LOW MDM: CPT

## 2019-02-24 RX ORDER — NEOMYCIN SULFATE, POLYMYXIN B SULFATE AND DEXAMETHASONE 3.5; 10000; 1 MG/ML; [USP'U]/ML; MG/ML
1 SUSPENSION/ DROPS OPHTHALMIC 4 TIMES DAILY
COMMUNITY
End: 2019-02-24

## 2019-02-24 RX ORDER — NEOMYCIN SULFATE, POLYMYXIN B SULFATE AND DEXAMETHASONE 3.5; 10000; 1 MG/ML; [USP'U]/ML; MG/ML
1 SUSPENSION/ DROPS OPHTHALMIC 2 TIMES DAILY
Status: DISCONTINUED | OUTPATIENT
Start: 2019-02-24 | End: 2019-02-24

## 2019-02-24 RX ORDER — ERYTHROMYCIN 5 MG/G
OINTMENT OPHTHALMIC
Status: COMPLETED | OUTPATIENT
Start: 2019-02-24 | End: 2019-02-24

## 2019-02-24 RX ORDER — NEOMYCIN SULFATE, POLYMYXIN B SULFATE AND DEXAMETHASONE 3.5; 10000; 1 MG/ML; [USP'U]/ML; MG/ML
1 SUSPENSION/ DROPS OPHTHALMIC 2 TIMES DAILY
Qty: 1 BOTTLE | Refills: 0 | Status: SHIPPED | OUTPATIENT
Start: 2019-02-24 | End: 2019-02-24

## 2019-02-24 RX ORDER — NEOMYCIN SULFATE, POLYMYXIN B SULFATE AND DEXAMETHASONE 3.5; 10000; 1 MG/ML; [USP'U]/ML; MG/ML
1 SUSPENSION/ DROPS OPHTHALMIC 4 TIMES DAILY
Qty: 1 BOTTLE | Refills: 0 | Status: SHIPPED | OUTPATIENT
Start: 2019-02-24 | End: 2020-01-25

## 2019-02-24 RX ADMIN — ERYTHROMYCIN: 5 OINTMENT OPHTHALMIC at 19:08
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
## 2019-02-24 NOTE — DISCHARGE INSTRUCTIONS
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
## 2019-02-25 NOTE — ED NOTES
No neomycin-poly dex in the pyxis. [address] [facility] and they confirmed that we only have the cipro 0.3%. Will let MD know.

## 2019-02-25 NOTE — ED NOTES
Magi Palencia NP at bedside reviewing patient's discharge instructions and reviewing medications. Patient ambulatory home with 1. Patient in no apparent distress.

## 2019-02-26 ENCOUNTER — HOSPITAL ENCOUNTER (OUTPATIENT)
Dept: PHYSICAL THERAPY | Age: 59
Discharge: HOME | End: 2019-02-26
Payer: COMMERCIAL

## 2019-02-26 PROCEDURE — 97014 ELECTRIC STIMULATION THERAPY: CPT

## 2019-02-26 PROCEDURE — 97110 THERAPEUTIC EXERCISES: CPT

## 2019-02-26 NOTE — PROGRESS NOTES
[facility]  [address], [address] [facility]    OUTPATIENT physical Therapy DAILY Treatment NOTe  Visit: 12    NAME: Debora Pradhan AGE: 66 y.o. GENDER: male  DATE: 2/26/2019  REFERRING PHYSICIAN: Carmel Steiner MD      GOALS  Short term goals  Time frame: 3 weeks  1. Patient will be compliant and independent with the initial HEP as evidenced by being able to perform without cuing. MET  2. Patient will report a 50% improvement in symptoms. NOT MET  3. Patient report a 50% improvement in sleeping. MET  4. Patient will tolerate 15 minutes of clinic activities before onset of symptoms. NOT MET     Long term goals  Time frame: 6 weeks  1. Patient will report pain level decrease to 2/10 to allow increased ease of movement. 2. Patient will have an improved score on the TXU Javan outcome measure by 12 points to demonstrate an increase in functional activity tolerance. 3. Patient will be independent in final individualized HEP. 4. Patient will sleep 6-8 hours without being interrupted by pain. SUBJECTIVE:   \"It's still sore\"  Pt is not wearing Cervical collar on arrival, Dr Andrew Peres told me to move my neck. Pain in: Neck 6/10; Back 5/10    OBJECTIVE DATA SUMMARY:   Objective data from initial evaluation:  Pain:  Location: Low back, neck and left shoulder and buttocks  Quality: aching, burning and throbbing  Now: 7/10  Worst: 10/10  Factors that improve pain: medication: Ibuprofen, Motrin used and beneficial     Posture:   Pt holds himself very stiffly     Strength:   Not tested d/t pain     Range of Motion:   Trunk ROM  Flexion 25% of normal  Extension 25% of normal  SB B/L 10%     Neck ROM  Flexion/Extension 10% of normal  Rotation 10%     Spinal Assessment:   Flattened lumbar spine        Joint Mobility:   Very TTP over spinous processes lumbar spine, B/L PSIS,     Palpation:    Left gluteal muscles.  B/L lumbar paraspinals,B/L UT,Rhomboids    Neurologic Assessment: Tone: WNL               Sensation: normal               Reflexes: not tested     Special Tests:   Not tested     Mobility:               Transitional Movements: Antalgic                Gait: antalgic     Balance: WNL     Functional Measure:   Yousuf Beard 17/24 2/19/2019 linda Srinivasan 18/24     TREATMENT/INTERVENTION:  Modalities (Rationale): to decrease pain and muscle guarding  MHP and e stim to bilateral neck and low back for 15 minutes in supine at end of session; no skin irritation noted     Therapeutic Exercises:  HEP: Cx ROM, Shoulder shrug, Flexion stretch over table, KTC, PPT, LTR, shoulder flexion using cane    Clinic exercises in BOLD performed this date:    Cervical spine AROM rotation and side bend: 10 reps  Flexion stretch over physioball: 10 reps  Seated trunk rotation: 5 reps each side   Seated trunk flexion/extension vertical towel roll x 7 reps  Seated Trunk SB  X 8 reps  Seated HS stretch 2 reps 30 sec hold    SKTC: 7 reps B  Bridges with adductor squeeze (green ball)  : 2 sets of 5 reps  LTR: 10 reps   Piriformis stretch with towel: 2 reps with 30 second holds B; required assistance   Adductor stretch 2 reps 15 sec hold  Passive HS stretch 3 reps with 15 sec hold. Standing   Rows with blue  TB x 15 reps  Pull downs with blue TB  10 reps  Hip abduction (no resistance) and extension (yellow TB resistance)10 reps each direction B/L    All 4's  Angry Cat x 5 reps  Child's pose 2 reps with 15 sec hold    Band walks yellow TB 15' x 2 reps    LBE/UBE x 2 minute  Resistance set at 3. TM x 2 min speed . 2     Manual Therapy:  Grade 1 IASTM to B/L UT, levator, low tolerance  Procedure explained to pt and he expressed understanding.   Gentle STM to bilateral UT   STM lumbar paraspinals and left gluteal muscles using green weighted ball, very low tolerance     Neuro Re-Education:      Activity tolerance and post treatment pain report:   Fair/Poor; low tolerance to stretches and exercises  Pain Out: 4/10    Education:  Education was provided to the patient on the following topics:.  []    No changes were made to the home exercise program.  [x]    The following changes were made to the home exercise program: Continue to encourage patient to wean for cervical soft collar, continue HEP, and perform self massage in shower to neck musculature  Patient verbalized understanding of the topics presented. ASSESSMENT:   Patient presents with 6/10 pain in neck and 5/10 low back. Patient arrives without soft collar. Will Abreu He remains very cautious and slow with his movements. He continues to require verbal cues to increase hold with stretches. Patient continues to be sensitive to palpation. Able to advance clinic activities this date with fair tolerance. Will Abreu He reports pain relief following e stim. Patients progression toward goals is as follows:  [x]     Improving appropriately and progressing toward goals  []     Improving slowly and progressing toward goals  []     Not making progress toward goals and plan of care will be adjusted    PLAN OF CARE:   Patient continues to benefit from skilled intervention to address the above impairments. [x]    Continue treatment per established plan of care.   []     Recommend the following changes to the plan of care    Recommendations/Intent for next treatment:TM, COLIN, STM    Amelia Duval, PT   Time Calculation: 45 mins  Patient Time in clinic:   Start Time: 1500   Stop Time: [phone]

## 2019-02-28 ENCOUNTER — HOSPITAL ENCOUNTER (OUTPATIENT)
Dept: PHYSICAL THERAPY | Age: 59
Discharge: HOME | End: 2019-02-28
Payer: COMMERCIAL

## 2019-02-28 PROCEDURE — 97014 ELECTRIC STIMULATION THERAPY: CPT | Performed by: PHYSICAL THERAPIST

## 2019-02-28 PROCEDURE — 97110 THERAPEUTIC EXERCISES: CPT | Performed by: PHYSICAL THERAPIST

## 2019-02-28 NOTE — PROGRESS NOTES
[facility]  [address], [address] [facility]    OUTPATIENT physical Therapy DAILY Treatment NOTe  Visit: 13    NAME: Guillermo Austin AGE: 66 y.o. GENDER: male  DATE: 2/28/2019  REFERRING PHYSICIAN: Anyi Fuller MD      GOALS  Short term goals  Time frame: 3 weeks  1. Patient will be compliant and independent with the initial HEP as evidenced by being able to perform without cuing. MET  2. Patient will report a 50% improvement in symptoms. MET  3. Patient report a 50% improvement in sleeping. MET  4. Patient will tolerate 15 minutes of clinic activities before onset of symptoms. NOT MET     Long term goals  Time frame: 6 weeks  1. Patient will report pain level decrease to 2/10 to allow increased ease of movement. 2. Patient will have an improved score on the Wendy Gun outcome measure by 12 points to demonstrate an increase in functional activity tolerance. 3. Patient will be independent in final individualized HEP. 4. Patient will sleep 6-8 hours without being interrupted by pain. SUBJECTIVE:   \"It's still stiff\"    Pain in: Neck 6/10; Back 6/10    OBJECTIVE DATA SUMMARY:   Objective data from initial evaluation:  Pain:  Location: Low back, neck and left shoulder and buttocks  Quality: aching, burning and throbbing  Now: 7/10  Worst: 10/10  Factors that improve pain: medication: Ibuprofen, Motrin used and beneficial     Posture:   Pt holds himself very stiffly     Strength:   Not tested d/t pain     Range of Motion:   Trunk ROM  Flexion 25% of normal  Extension 25% of normal  SB B/L 10%     Neck ROM  Flexion/Extension 10% of normal  Rotation 10%     Spinal Assessment:   Flattened lumbar spine     Joint Mobility:   Very TTP over spinous processes lumbar spine, B/L PSIS,     Palpation:    Left gluteal muscles. B/L lumbar paraspinals,B/L UT,Rhomboids    Neurologic Assessment:               Tone:  WNL               Sensation: normal               Reflexes: not tested     Special Tests:   Not tested     Mobility:               Transitional Movements: Antalgic                Gait: antalgic     Balance: WNL     Functional Measure:   Fredi Luevano 17/24 2/19/2019 linda Srinivasan 18/24     TREATMENT/INTERVENTION:  Modalities (Rationale): to decrease pain and muscle guarding  MHP and e stim to bilateral neck and low back for 15 minutes in supine at end of session; no skin irritation noted     Therapeutic Exercises:  HEP: Cx ROM, Shoulder shrug, Flexion stretch over table, KTC, PPT, LTR, shoulder flexion using cane    Clinic exercises in BOLD performed this date:    Cervical spine AROM rotation and side bend: 10 reps  Flexion stretch over physioball: 10 reps  Seated trunk rotation: 5 reps each side   Seated trunk flexion/extension vertical towel roll x 7 reps  Seated Trunk SB  X 8 reps  Seated HS stretch 2 reps 30 sec hold    SKTC: 7 reps B  Bridges with adductor squeeze (green ball)  : 2 sets of 5 reps  LTR: 10 reps   Piriformis stretch with towel: 2 reps with 30 second holds B; required assistance   Adductor stretch 2 reps 15 sec hold  Passive HS stretch 3 reps with 15 sec hold. Standing   Rows with blue  TB x 15 reps  Pull downs with blue TB  X 15 reps  Hip abduction (no resistance) and extension (yellow TB resistance)10 reps each direction B/L  Band walks red TB 15' x 2 each direction    All 4's  Angry Cat x 8 reps  Child's pose 2 reps with 15 sec hold    LBE/UBE x 3 minute; Resistance set at 2   TM x 2 min speed . 2     Manual Therapy:  Grade 1 IASTM to B/L UT, levator, low tolerance  Procedure explained to pt and he expressed understanding.   Gentle STM to bilateral UT   STM lumbar paraspinals and left gluteal muscles using green weighted ball, very low tolerance    Activity tolerance and post treatment pain report:   Fair/Poor; low tolerance to stretches and exercises  Pain Out: 4/10    Education:  Education was provided to the patient on the following topics:.  [] No changes were made to the home exercise program.  [x]    The following changes were made to the home exercise program: continue HEP, and perform self massage in shower to neck musculature  Patient verbalized understanding of the topics presented. ASSESSMENT:   Patient presents with 6/10 pain in neck and 5/10 low back. Patient arrives without soft collar again today. He remains cautious and slow with his movements, but has improved since evaluation. He reports that he has been performing the exercises at home and his fiance is making sure he performs them as instructed on the HEP sheet. He reports improvement with sleeping. Patient continues to be sensitive to palpation. He reports pain relief following e stim. Patients progression toward goals is as follows:  [x]     Improving appropriately and progressing toward goals  []     Improving slowly and progressing toward goals  []     Not making progress toward goals and plan of care will be adjusted    PLAN OF CARE:   Patient continues to benefit from skilled intervention to address the above impairments. [x]    Continue treatment per established plan of care.   []     Recommend the following changes to the plan of care    Recommendations/Intent for next treatment:COLIN DORANTES, TIMOTEO Goyal Arm, PT   Time Calculation: 49 mins  Patient Time in clinic:   Start Time: 1430   Stop Time: [phone]

## 2019-03-06 ENCOUNTER — HOSPITAL ENCOUNTER (OUTPATIENT)
Dept: PHYSICAL THERAPY | Age: 59
Discharge: HOME | End: 2019-03-06
Payer: COMMERCIAL

## 2019-03-06 PROCEDURE — 97110 THERAPEUTIC EXERCISES: CPT

## 2019-03-06 PROCEDURE — 97014 ELECTRIC STIMULATION THERAPY: CPT

## 2019-03-06 NOTE — PROGRESS NOTES
[facility]  [address], [address] [facility]    OUTPATIENT physical Therapy DAILY Treatment NOTe  Visit: 14    NAME: Dimple Dupree AGE: 66 y.o. GENDER: male  DATE: 3/18/2019  REFERRING PHYSICIAN: Venkatesh Mccall MD      GOALS  Short term goals  Time frame: 3 weeks  1. Patient will be compliant and independent with the initial HEP as evidenced by being able to perform without cuing. MET  2. Patient will report a 50% improvement in symptoms. MET  3. Patient report a 50% improvement in sleeping. MET  4. Patient will tolerate 15 minutes of clinic activities before onset of symptoms. NOT MET     Long term goals  Time frame: 6 weeks  1. Patient will report pain level decrease to 2/10 to allow increased ease of movement. 2. Patient will have an improved score on the TXU Javan outcome measure by 12 points to demonstrate an increase in functional activity tolerance. 3. Patient will be independent in final individualized HEP. 4. Patient will sleep 6-8 hours without being interrupted by pain. 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 Pt not required to do any lifting on the job at this time  Pain in: Neck 6/10; Back 6/10    OBJECTIVE DATA SUMMARY:   Objective data from initial evaluation:  Pain:  Location: Low back, neck and left shoulder and buttocks  Quality: aching, burning and throbbing  Now: 7/10  Worst: 10/10  Factors that improve pain: medication: Ibuprofen, Motrin used and beneficial     Posture:   Pt holds himself very stiffly     Strength:   Not tested d/t pain     Range of Motion:   Trunk ROM  Flexion 25% of normal  Extension 25% of normal  SB B/L 10%     Neck ROM  Flexion/Extension 10% of normal  Rotation 10%     Spinal Assessment:   Flattened lumbar spine     Joint Mobility:   Very TTP over spinous processes lumbar spine, B/L PSIS,     Palpation:    Left gluteal muscles.  B/L lumbar paraspinals,B/L UT,Rhomboids    Neurologic Assessment:               Tone: WNL               Sensation: normal               Reflexes: not tested     Special Tests:   Not tested     Mobility:               Transitional Movements: Antalgic                Gait: antalgic     Balance: WNL     Functional Measure:   Wendy Alaniz 17/24 2/19/2019 linda Srinivasan 18/24     TREATMENT/INTERVENTION:  Modalities (Rationale): to decrease pain and muscle guarding  MHP and e stim to bilateral neck and low back for 15 minutes in supine at end of session; no skin irritation noted     Therapeutic Exercises:  HEP: Cx ROM, Shoulder shrug, Flexion stretch over table, KTC, PPT, LTR, shoulder flexion using cane    Clinic exercises in BOLD performed this date:    Cervical spine AROM rotation and side bend: 10 reps  Flexion stretch over physioball: 10 reps  Seated trunk rotation: 5 reps each side   Seated trunk flexion/extension vertical towel roll x 7 reps  Seated Trunk SB  X 8 reps  Seated HS stretch 2 reps 30 sec hold    SKTC: 10 reps B  Bridges with adductor squeeze (ball)  : 2 sets of 5 reps  LTR: 10 reps   Piriformis stretch with towel: 2 reps with 30 second holds B; required assistance   Adductor stretch 2 reps 15 sec hold  Passive HS stretch 3 reps with 15 sec hold. Standing   Rows with blue  TB  on blue physioball x 15 reps  Pull downs with blue TB  on blue physioball X 15 reps  Hip abduction (no resistance) and extension (yellow TB resistance)10 reps each direction B/L  Band walks red TB 15' x 2 each direction    All 4's  Angry Cat x 8 reps  Child's pose 3 reps with 15 sec hold  Attempted UE and LE extension, 2 reps each    LBE/UBE x 2 minute; No resitance  TM x 2 min speed . 2     Manual Therapy:  Grade 1 IASTM to B/L UT, levator, low tolerance  Procedure explained to pt and he expressed understanding.   Gentle STM to bilateral UT   STM lumbar paraspinals and left gluteal muscles using green weighted ball, very low tolerance    Activity tolerance and post treatment pain report:   Fair/Poor; low tolerance to stretches and exercises  Pain Out: 4/10    Education:  Education was provided to the patient on the following topics:.  []    No changes were made to the home exercise program.  [x]    The following changes were made to the home exercise program: continue HEP, and perform self massage in shower to neck musculature  Patient verbalized understanding of the topics presented. ASSESSMENT:   Patient presents with 6/10 pain in neck and 5/10 low back. Patient reports that he went back to work on Monday (3/4), this increased his pain. He remains cautious and slow with his movements, but has made progress. Drea Deandre He reports that he has been performing the exercises at home and his fiance is making sure he performs them as instructed on the HEP sheet and he is sleeping better. Added physio ball core exercises this date, advance as tolerated. Patient continues to be sensitive to palpation. He reports pain relief following e stim. Patients progression toward goals is as follows:  [x]     Improving appropriately and progressing toward goals  []     Improving slowly and progressing toward goals  []     Not making progress toward goals and plan of care will be adjusted    PLAN OF CARE:   Patient continues to benefit from skilled intervention to address the above impairments. [x]    Continue treatment per established plan of care.   []     Recommend the following changes to the plan of care    Recommendations/Intent for next treatment:JAYNA, COLIN, TIMOTEO Harman PT   Time Calculation: 45 mins  Patient Time in clinic:   Start Time: 1500   Stop Time: 1545    TREATMENT PLAN EFFECTIVE DATES:   3/6/2019 to 5/6/2019  I have read the above plan of care for Gloria Colunga and certify the need for skilled physical therapy services.     Physician Signature: ____________________________________________________     Date: _________________________________________________________________

## 2019-03-08 ENCOUNTER — HOSPITAL ENCOUNTER (OUTPATIENT)
Dept: PHYSICAL THERAPY | Age: 59
Discharge: HOME | End: 2019-03-08
Payer: COMMERCIAL

## 2019-03-08 PROCEDURE — 97014 ELECTRIC STIMULATION THERAPY: CPT | Performed by: PHYSICAL THERAPIST

## 2019-03-08 PROCEDURE — 97110 THERAPEUTIC EXERCISES: CPT | Performed by: PHYSICAL THERAPIST

## 2019-03-08 NOTE — PROGRESS NOTES
[facility]  [address], [address] [facility]    OUTPATIENT physical Therapy DAILY Treatment NOTe  Visit: 15    NAME: Lori Carbajal AGE: 66 y.o. GENDER: male  DATE: 3/8/2019  REFERRING PHYSICIAN: Abdirizak Ruiz MD      GOALS  Short term goals  Time frame: 3 weeks  1. Patient will be compliant and independent with the initial HEP as evidenced by being able to perform without cuing. MET  2. Patient will report a 50% improvement in symptoms. MET  3. Patient report a 50% improvement in sleeping. MET  4. Patient will tolerate 15 minutes of clinic activities before onset of symptoms. NOT MET     Long term goals  Time frame: 6 weeks  1. Patient will report pain level decrease to 2/10 to allow increased ease of movement. 2. Patient will have an improved score on the TXU Javan outcome measure by 12 points to demonstrate an increase in functional activity tolerance. 3. Patient will be independent in final individualized HEP. 4. Patient will sleep 6-8 hours without being interrupted by pain. SUBJECTIVE:   \"It's been hurting more since I've been back to work. \"    Pain in: Neck 7/10; Back 7/10    OBJECTIVE DATA SUMMARY:   Objective data from initial evaluation:  Pain:  Location: Low back, neck and left shoulder and buttocks  Quality: aching, burning and throbbing  Now: 7/10  Worst: 10/10  Factors that improve pain: medication: Ibuprofen, Motrin used and beneficial     Posture:   Pt holds himself very stiffly     Strength:   Not tested d/t pain     Range of Motion:   Trunk ROM  Flexion 25% of normal  Extension 25% of normal  SB B/L 10%     Neck ROM  Flexion/Extension 10% of normal  Rotation 10%     Spinal Assessment:   Flattened lumbar spine     Joint Mobility:   Very TTP over spinous processes lumbar spine, B/L PSIS,     Palpation:    Left gluteal muscles. B/L lumbar paraspinals,B/L UT,Rhomboids    Neurologic Assessment:               Tone:  WNL               Sensation: normal Reflexes: not tested     Special Tests:   Not tested     Mobility:               Transitional Movements: Antalgic                Gait: antalgic     Balance: WNL     Functional Measure:   Jennifer Grove 17/24 2/19/2019 linda Srinivasan 18/24     TREATMENT/INTERVENTION:  Modalities (Rationale): to decrease pain and muscle guarding  MHP and e stim to bilateral neck and low back for 15 minutes in supine at end of session; no skin irritation noted     Therapeutic Exercises:  HEP: Cx ROM, Shoulder shrug, Flexion stretch over table, KTC, PPT, LTR, shoulder flexion using cane    Clinic exercises in BOLD performed this date:    Cervical spine AROM rotation and side bend: 10 reps  Flexion stretch over physioball: 10 reps  Seated trunk rotation: 5 reps each side   Seated trunk flexion/extension vertical towel roll x 7 reps  Seated Trunk SB stretch: 5 reps  Seated HS stretch 2 reps 30 sec hold    SKTC: 10 reps B  Bridges with adductor squeeze (ball)  : 2 sets of 5 reps  LTR: 10 reps   Piriformis stretch with towel: 2 reps with 30 second holds B; required assistance   Hip adductor stretch 2 reps 15 sec hold    Standing   Rows with blue TB on blue physioball x 10 reps  Pull downs with blue TB on blue physioball X 10 reps  Hip abduction (no resistance) and extension (yellow TB resistance)10 reps each direction B/L  Band walks red TB 15' x 2 each direction    All 4's  Angry Cat x 8 reps  Child's pose 3 reps with 15 sec hold  Attempted UE and LE extension, 2 reps each    LBE/UBE x 5 minute; No resistance     TM x 2 min speed . 2     Manual Therapy:  Grade 1 IASTM to B/L UT, levator, low tolerance  Procedure explained to pt and he expressed understanding.   Gentle STM to bilateral UT   STM lumbar paraspinals and left gluteal muscles using green weighted ball, very low tolerance    Activity tolerance and post treatment pain report:   Fair; continued low tolerance to stretches and exercises  Pain Out: 5/10    Education:  Education was provided to the patient on the following topics:.  []    No changes were made to the home exercise program.  [x]    The following changes were made to the home exercise program: continue HEP, and perform self massage in shower to neck musculature  Patient verbalized understanding of the topics presented. ASSESSMENT:   Patient presents with 7/10 pain in neck and low back. Patient reports that he went back to work on Monday (3/4) which has increased his pain. He continues to move slowly, but overall improvement since evaluation. He continues to perform exercises at home with the help of his fiance. Patient continues to be sensitive to palpation. He reports pain relief following e stim. Patients progression toward goals is as follows:  [x]     Improving appropriately and progressing toward goals  []     Improving slowly and progressing toward goals  []     Not making progress toward goals and plan of care will be adjusted    PLAN OF CARE:   Patient continues to benefit from skilled intervention to address the above impairments. [x]    Continue treatment per established plan of care.   []     Recommend the following changes to the plan of care    Recommendations/Intent for next treatment: COLIN, TIMOTEO Mercedes, PT   Time Calculation: 48 mins  Patient Time in clinic:   Start Time: 1355   Stop Time: [id number]

## 2019-03-12 ENCOUNTER — HOSPITAL ENCOUNTER (OUTPATIENT)
Dept: PHYSICAL THERAPY | Age: 59
Discharge: HOME | End: 2019-03-12
Payer: COMMERCIAL

## 2019-03-12 PROCEDURE — 97014 ELECTRIC STIMULATION THERAPY: CPT

## 2019-03-12 PROCEDURE — 97110 THERAPEUTIC EXERCISES: CPT

## 2019-03-12 NOTE — PROGRESS NOTES
[facility]  [address], [address] [facility]    OUTPATIENT physical Therapy DAILY Treatment NOTe  Visit: 16    NAME: Eliseo Olivares AGE: 66 y.o. GENDER: male  DATE: 3/12/2019  REFERRING PHYSICIAN: Marcia Colon MD      GOALS  Short term goals  Time frame: 3 weeks  1. Patient will be compliant and independent with the initial HEP as evidenced by being able to perform without cuing. MET  2. Patient will report a 50% improvement in symptoms. MET  3. Patient report a 50% improvement in sleeping. MET  4. Patient will tolerate 15 minutes of clinic activities before onset of symptoms. NOT MET     Long term goals  Time frame: 6 weeks  1. Patient will report pain level decrease to 2/10 to allow increased ease of movement. 2. Patient will have an improved score on the TXU Javan outcome measure by 12 points to demonstrate an increase in functional activity tolerance. 3. Patient will be independent in final individualized HEP. 4. Patient will sleep 6-8 hours without being interrupted by pain. SUBJECTIVE:   \"It's been hurting more since I've been back to work. \"    Pain in: Neck 7/10; Back 7/10    OBJECTIVE DATA SUMMARY:   Objective data from initial evaluation:  Pain:  Location: Low back, neck and left shoulder and buttocks  Quality: aching, burning and throbbing  Now: 7/10  Worst: 10/10  Factors that improve pain: medication: Ibuprofen, Motrin used and beneficial     Posture:   Pt holds himself very stiffly     Strength:   Not tested d/t pain     Range of Motion:   Trunk ROM  Flexion 25% of normal  Extension 25% of normal  SB B/L 10%     Neck ROM  Flexion/Extension 10% of normal  Rotation 10%     Spinal Assessment:   Flattened lumbar spine     Joint Mobility:   Very TTP over spinous processes lumbar spine, B/L PSIS,     Palpation:    Left gluteal muscles. B/L lumbar paraspinals,B/L UT,Rhomboids    Neurologic Assessment:               Tone:  WNL               Sensation: normal Reflexes: not tested     Special Tests:   Not tested     Mobility:               Transitional Movements: Antalgic                Gait: antalgic     Balance: WNL     Functional Measure:   Odette Leahy 17/24 2/19/2019 linda Srinivasan 18/24     TREATMENT/INTERVENTION:  Modalities (Rationale): to decrease pain and muscle guarding  MHP and e stim to bilateral neck and low back for 15 minutes in supine at end of session; no skin irritation noted     Therapeutic Exercises:  HEP: Cx ROM, Shoulder shrug, Flexion stretch over table, KTC, PPT, LTR, shoulder flexion using cane    Clinic exercises in BOLD performed this date:    Cervical spine AROM rotation and side bend: 10 reps  Flexion stretch over physioball: 10 reps  Seated trunk rotation: 5 reps each side   Seated trunk flexion/extension vertical towel roll x 7 reps  Seated Trunk SB stretch: 5 reps      SKTC: 10 reps B  Bridges with adductor squeeze (ball)  : 2 sets of 5 reps  LTR: 10 reps   Piriformis stretch with towel: 2 reps with 30 second holds B; required assistance   Hip adductor stretch 2 reps 15 sec hold    Standing   Rows 20# 10 reps   Pull downs 20# 4 reps  Hip abduction (no resistance) and extension (yellow TB resistance)10 reps each direction B/L  Band walks red TB 15' x 2 each direction  HS stretch 3 reps 20 sec hold    All 4's  Angry Cat x 8 reps  Child's pose 3 reps with 15 sec hold  Attempted UE and LE extension, 2 reps each    LBE/UBE x 3.5  minute; Resistance 3    TM x 2 min speed . 2     Manual Therapy:  Grade 1 IASTM to B/L UT, levator, low tolerance  Procedure explained to pt and he expressed understanding.   Gentle STM to bilateral UT   STM lumbar paraspinals and left gluteal muscles using green weighted ball, very low tolerance    Activity tolerance and post treatment pain report:   Fair; continued low tolerance to stretches and exercises  Pain Out: 5/10    Education:  Education was provided to the patient on the following topics:.  [] No changes were made to the home exercise program.  [x]    The following changes were made to the home exercise program: continue HEP, and perform self massage in shower to neck musculature  Patient verbalized understanding of the topics presented. ASSESSMENT:   Patient presents with 5/10 pain in neck and low back. Patient reports that he went back to work on Monday (3/4) which has increased his pain. He continues to move slowly, but overall improvement since evaluation. Advanced clinic activities this date as above. Patients progression toward goals is as follows:  [x]     Improving appropriately and progressing toward goals  []     Improving slowly and progressing toward goals  []     Not making progress toward goals and plan of care will be adjusted    PLAN OF CARE:   Patient continues to benefit from skilled intervention to address the above impairments. [x]    Continue treatment per established plan of care.   []     Recommend the following changes to the plan of care    Recommendations/Intent for next treatment: IASJAYNA, STM    Anne Rose, PT   Time Calculation: 50 mins  Patient Time in clinic:   Start Time: 1500   Stop Time: [id number]

## 2019-03-14 ENCOUNTER — HOSPITAL ENCOUNTER (OUTPATIENT)
Dept: PHYSICAL THERAPY | Age: 59
Discharge: HOME | End: 2019-03-14
Payer: COMMERCIAL

## 2019-03-14 PROCEDURE — 97110 THERAPEUTIC EXERCISES: CPT | Performed by: PHYSICAL THERAPIST

## 2019-03-14 PROCEDURE — 97014 ELECTRIC STIMULATION THERAPY: CPT | Performed by: PHYSICAL THERAPIST

## 2019-03-14 NOTE — PROGRESS NOTES
[facility]  [address], [address] [facility]    OUTPATIENT physical Therapy DAILY Treatment NOTe  Visit: 17    NAME: Bren Villa AGE: 66 y.o. GENDER: male  DATE: 3/14/2019  REFERRING PHYSICIAN: Karine Dumont MD      GOALS  Short term goals  Time frame: 3 weeks  1. Patient will be compliant and independent with the initial HEP as evidenced by being able to perform without cuing. MET  2. Patient will report a 50% improvement in symptoms. MET  3. Patient report a 50% improvement in sleeping. MET  4. Patient will tolerate 15 minutes of clinic activities before onset of symptoms. NOT MET     Long term goals  Time frame: 6 weeks  1. Patient will report pain level decrease to 2/10 to allow increased ease of movement. 2. Patient will have an improved score on the TXU Javan outcome measure by 12 points to demonstrate an increase in functional activity tolerance. 3. Patient will be independent in final individualized HEP. 4. Patient will sleep 6-8 hours without being interrupted by pain. SUBJECTIVE:   \"I'm exhausted\"    Pain in: Neck 6.5/10; Back 6.5/10    OBJECTIVE DATA SUMMARY:   Objective data from initial evaluation:  Pain:  Location: Low back, neck and left shoulder and buttocks  Quality: aching, burning and throbbing  Now: 7/10  Worst: 10/10  Factors that improve pain: medication: Ibuprofen, Motrin used and beneficial     Posture:   Pt holds himself very stiffly     Strength:   Not tested d/t pain     Range of Motion:   Trunk ROM  Flexion 25% of normal  Extension 25% of normal  SB B/L 10%     Neck ROM  Flexion/Extension 10% of normal  Rotation 10%     Spinal Assessment:   Flattened lumbar spine     Joint Mobility:   Very TTP over spinous processes lumbar spine, B/L PSIS,     Palpation:    Left gluteal muscles. B/L lumbar paraspinals,B/L UT,Rhomboids    Neurologic Assessment:               Tone:  WNL               Sensation: normal               Reflexes: not tested     Special Tests:   Not tested     Mobility:               Transitional Movements: Antalgic                Gait: antalgic     Balance: WNL     Functional Measure:   Jorge Duran 17/24 2/19/2019 linda Srinivasan 18/24     TREATMENT/INTERVENTION:  Modalities (Rationale): to decrease pain and muscle guarding  MHP and e stim to bilateral neck and low back for 15 minutes in supine at end of session; no skin irritation noted     Therapeutic Exercises:  HEP: Cx ROM, Shoulder shrug, Flexion stretch over table, KTC, PPT, LTR, shoulder flexion using cane    Clinic exercises in BOLD performed this date:    Cervical spine AROM rotation and side bend: 10 reps  Flexion stretch over physioball: 10 reps  Seated trunk rotation: 5 reps each side   Seated trunk flexion/extension vertical towel roll x 7 reps  Seated Trunk SB stretch: 5 reps    SKTC: 5 reps B  Bridges with adductor squeeze (ball)  : 2 sets of 5 reps  LTR: 10 reps   Pelvic tilts: 5 reps  Piriformis stretch with towel: 2 reps with 30 second holds B; required assistance   Hip adductor stretch: 2 reps  Hip adduction with ball: 10 reps    Standing   Rows 20# 10 reps   Pull downs 20# 4 reps  Hip abduction (no resistance) and extension (yellow TB resistance)10 reps each direction B/L  Band walks red TB 15' x 2 each direction  HS stretch 3 reps 20 sec hold    All 4's  Angry Cat x 5 reps  Child's pose 3 reps with 15 sec hold  Attempted UE and LE extension, 2 reps each    LBE/UBE x 3.5  minute; Resistance 3    TM x 2 min speed . 2     Manual Therapy:  Grade 1 IASTM to B/L UT, levator, low tolerance  Procedure explained to pt and he expressed understanding.   Gentle STM to bilateral UT   STM lumbar paraspinals and left gluteal muscles using green weighted ball, very low tolerance    Activity tolerance and post treatment pain report:   Fair; continued low tolerance to stretches and exercises  Pain Out: 6/10    Education:  Education was provided to the patient on the following topics:.  []    No changes were made to the home exercise program.  [x]    The following changes were made to the home exercise program: continue HEP, and perform self massage in shower to neck musculature  Patient verbalized understanding of the topics presented. ASSESSMENT:   Patient presents with continued pain in neck and low back. Patient continues to report increased pain due to activity at work. He continues to move slowly, but overall improvement since evaluation. Continue to increased challenge with exercises as patient able. Pain relief with heat and e stim. Patients progression toward goals is as follows:  [x]     Improving appropriately and progressing toward goals  []     Improving slowly and progressing toward goals  []     Not making progress toward goals and plan of care will be adjusted    PLAN OF CARE:   Patient continues to benefit from skilled intervention to address the above impairments. [x]    Continue treatment per established plan of care.   []     Recommend the following changes to the plan of care    Recommendations/Intent for next treatment: IASTM, STM    Arsh Mcdonald, PT   Time Calculation: 45 mins  Patient Time in clinic:   Start Time: 1500   Stop Time: [phone]

## 2019-03-18 ENCOUNTER — HOSPITAL ENCOUNTER (OUTPATIENT)
Dept: PHYSICAL THERAPY | Age: 59
Discharge: HOME | End: 2019-03-18
Payer: COMMERCIAL

## 2019-03-18 PROCEDURE — 97014 ELECTRIC STIMULATION THERAPY: CPT

## 2019-03-18 PROCEDURE — 97110 THERAPEUTIC EXERCISES: CPT

## 2019-03-18 NOTE — PROGRESS NOTES
[facility]  [address], [address] [facility]    OUTPATIENT physical Therapy DAILY Treatment NOTe  Visit: 18    NAME: Argelia Luu AGE: 66 y.o. GENDER: male  DATE: 3/18/2019  REFERRING PHYSICIAN: Lyndsay Beaulieu MD      GOALS  Short term goals  Time frame: 3 weeks  1. Patient will be compliant and independent with the initial HEP as evidenced by being able to perform without cuing. MET  2. Patient will report a 50% improvement in symptoms. MET  3. Patient report a 50% improvement in sleeping. MET  4. Patient will tolerate 15 minutes of clinic activities before onset of symptoms. NOT MET     Long term goals  Time frame: 6 weeks  1. Patient will report pain level decrease to 2/10 to allow increased ease of movement. 2. Patient will have an improved score on the TXU Javan outcome measure by 12 points to demonstrate an increase in functional activity tolerance. 3. Patient will be independent in final individualized HEP. 4. Patient will sleep 6-8 hours without being interrupted by pain. SUBJECTIVE:   I think it is getting better. 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     Pain in: Neck 5/10; Back 5/10    OBJECTIVE DATA SUMMARY:   Objective data from initial evaluation:  Pain:  Location: Low back, neck and left shoulder and buttocks  Quality: aching, burning and throbbing  Now: 7/10  Worst: 10/10  Factors that improve pain: medication: Ibuprofen, Motrin used and beneficial     Posture:   Pt holds himself very stiffly     Strength:   Not tested d/t pain     Range of Motion:   Trunk ROM  Flexion 25% of normal  Extension 25% of normal  SB B/L 10%     Neck ROM  Flexion/Extension 10% of normal  Rotation 10%     Spinal Assessment:   Flattened lumbar spine     Joint Mobility:   Very TTP over spinous processes lumbar spine, B/L PSIS,     Palpation:    Left gluteal muscles. B/L lumbar paraspinals,B/L UT,Rhomboids    Neurologic Assessment:               Tone: WNL               Sensation: normal               Reflexes: not tested     Special Tests:   Not tested     Mobility:               Transitional Movements: Antalgic                Gait: antalgic     Balance: WNL     Functional Measure:   Simon Nolan 17/24 2/19/2019 linda Srinivasan 18/24     TREATMENT/INTERVENTION:  Modalities (Rationale): to decrease pain and muscle guarding  MHP and e stim to bilateral neck and low back for 15 minutes in supine at end of session; no skin irritation noted     Therapeutic Exercises:  HEP: Cx ROM, Shoulder shrug, Flexion stretch over table, KTC, PPT, LTR, shoulder flexion using cane    Clinic exercises in BOLD performed this date:    Cervical spine AROM rotation and side bend: 10 reps  Flexion stretch over physioball: 10 reps  Seated trunk rotation: 5 reps each side   Seated trunk flexion/extension vertical towel roll x 7 reps  Seated Trunk SB stretch: 5 reps    SKTC: 5 reps B  Bridges with adductor squeeze (ball)  : 2 sets of 5 reps  LTR: 10 reps   Pelvic tilts: 5 reps  Piriformis stretch with towel: 2 reps with 30 second holds B; required assistance   Hip adductor stretch: 2 reps  Hip adduction with ball: 10 reps    Standing   Rows 20# 10 reps   Pull downs 20# 4 reps  Hip abduction and extension (yellow TB resistance)10 reps each direction B/L  Band walks red TB 15' x 2 each direction  HS stretch 3 reps 20 sec hold    All 4's  Angry Cat x 5 reps  Child's pose 3 reps with 15 sec hold  UE ext, 2 reps each limited motion    LBE/UBE x 3.5  minute; Resistance 3.5    TM x 2 min speed . 2     Manual Therapy:  Grade 1 IASTM to B/L UT, levator, low tolerance  Procedure explained to pt and he expressed understanding.   Gentle STM to bilateral UT   STM lumbar paraspinals and left gluteal muscles using green weighted ball, very low tolerance    Activity tolerance and post treatment pain report:   Fair; continued low tolerance to stretches and exercises  Pain Out: 6/10    Education:  Education was provided to the patient on the following topics:.  []    No changes were made to the home exercise program.  [x]    The following changes were made to the home exercise program: continue HEP, and perform self massage in shower to neck musculature  Patient verbalized understanding of the topics presented. ASSESSMENT:   Patient presents with continued pain in neck and low back, back working full time. He continues to move slowly, but overall improvement since evaluation. Continue to increased challenge with exercises as patient able. Poor tolerance for STM b/L UT, will continue to advance. Pain relief with heat and e stim. Patients progression toward goals is as follows:  [x]     Improving appropriately and progressing toward goals  []     Improving slowly and progressing toward goals  []     Not making progress toward goals and plan of care will be adjusted    PLAN OF CARE:   Patient continues to benefit from skilled intervention to address the above impairments. [x]    Continue treatment per established plan of care.   []     Recommend the following changes to the plan of care    Recommendations/Intent for next treatment: IASJAYNA, TIMOTEO Romero, PT   Time Calculation: 50 mins  Patient Time in clinic:   Start Time: 0800   Stop Time: 8146

## 2019-03-22 ENCOUNTER — HOSPITAL ENCOUNTER (OUTPATIENT)
Dept: PHYSICAL THERAPY | Age: 59
Discharge: HOME | End: 2019-03-22
Payer: COMMERCIAL

## 2019-03-22 PROCEDURE — 97110 THERAPEUTIC EXERCISES: CPT | Performed by: PHYSICAL THERAPIST

## 2019-03-22 PROCEDURE — 97014 ELECTRIC STIMULATION THERAPY: CPT | Performed by: PHYSICAL THERAPIST

## 2019-03-22 NOTE — PROGRESS NOTES
[facility]  [address], [address] [facility]    OUTPATIENT physical Therapy DAILY Treatment NOTe  Visit: 19    NAME: Butch Doctor AGE: 66 y.o. GENDER: male  DATE: 3/22/2019  REFERRING PHYSICIAN: Adeel Husain MD      GOALS  Short term goals  Time frame: 3 weeks  1. Patient will be compliant and independent with the initial HEP as evidenced by being able to perform without cuing. MET  2. Patient will report a 50% improvement in symptoms. MET  3. Patient report a 50% improvement in sleeping. MET  4. Patient will tolerate 15 minutes of clinic activities before onset of symptoms. MET     Long term goals  Time frame: 6 weeks  1. Patient will report pain level decrease to 2/10 to allow increased ease of movement. 2. Patient will have an improved score on the TXU Javan outcome measure by 12 points to demonstrate an increase in functional activity tolerance. 3. Patient will be independent in final individualized HEP. 4. Patient will sleep 6-8 hours without being interrupted by pain. SUBJECTIVE:   \"It's still rough with work. \"    Pain in: Neck 5/10; Back 5/10    OBJECTIVE DATA SUMMARY:   Objective data from initial evaluation:  Pain:  Location: Low back, neck and left shoulder and buttocks  Quality: aching, burning and throbbing  Now: 7/10  Worst: 10/10  Factors that improve pain: medication: Ibuprofen, Motrin used and beneficial     Posture:   Pt holds himself very stiffly     Strength:   Not tested d/t pain     Range of Motion:   Trunk ROM  Flexion 25% of normal  Extension 25% of normal  SB B/L 10%     Neck ROM  Flexion/Extension 10% of normal  Rotation 10%     Spinal Assessment:   Flattened lumbar spine     Joint Mobility:   Very TTP over spinous processes lumbar spine, B/L PSIS,     Palpation:    Left gluteal muscles. B/L lumbar paraspinals,B/L UT,Rhomboids    Neurologic Assessment:               Tone:  WNL               Sensation: normal               Reflexes: not tested     Special Tests:   Not tested     Mobility:               Transitional Movements: Antalgic                Gait: antalgic     Balance: WNL     Functional Measure:   Elana Herron: 17/24 2/19/2019 Fransisco Srinivasan: 18/24     TREATMENT/INTERVENTION:  Modalities (Rationale): to decrease pain and muscle guarding  MHP and e stim to bilateral neck and low back for 15 minutes in supine at end of session; no skin irritation noted     Therapeutic Exercises:  HEP: Cx ROM, Shoulder shrug, Flexion stretch over table, KTC, PPT, LTR, shoulder flexion using cane    Clinic exercises in BOLD performed this date:    Cervical spine AROM rotation and side bend: 10 reps  Lower cervical/upper thoracic stretch: 3 reps with 10 second holds  Flexion stretch over physioball: 10 reps  Seated trunk rotation: 5 reps each side   Seated trunk flexion stretch: 5 reps  Seated trunk flexion/extension vertical towel roll x 7 reps  Seated Trunk SB stretch: 5 reps    SKTC: 5 reps B  Bridges with adductor squeeze (ball)  : 2 sets of 5 reps  LTR: 10 reps   Pelvic tilts: 5 reps  Piriformis stretch with towel: 2 reps with 30 second holds B; required assistance   Hip adductor stretch: 2 reps  Hip adduction with ball: 10 reps    Standing   Rows 20#: 10 reps   Pull downs with red TB: 10 reps  Hip abduction and extension (yellow TB resistance)10 reps each direction B/L  Band walks red TB 15' x 2 each direction  HS stretch 3 reps 20 sec hold    All 4's  Angry Cat x 5 reps  Child's pose 3 reps with 15 sec hold  UE ext, 2 reps each limited motion    LBE/UBE x 4  minute; Resistance 3.5    Manual Therapy:  Grade 1 IASTM to B/L UT, levator, low tolerance  Procedure explained to pt and he expressed understanding.   Gentle STM to bilateral UT   STM lumbar paraspinals and left gluteal muscles using green weighted ball, very low tolerance    Activity tolerance and post treatment pain report:   Fair; improved tolerance to exercises  Pain Out: 4/10    Education:  Education was provided to the patient on the following topics:.  []    No changes were made to the home exercise program.  [x]    The following changes were made to the home exercise program: continue HEP, and perform self massage in shower to neck musculature  Patient verbalized understanding of the topics presented. ASSESSMENT:   Patient presents with 5/10 pain in neck and low back. He has been back working full time as a  but reports increased pain during his shift. He continues to move slowly, but overall improvement since evaluation. Patient is able to tolerate clinic exercises with less rest breaks. He continues to be tender to palpation in bilateral UT, levator scapulae, rhomboids, piriformis, and cervical/thoracic/lumbar paraspinals. Pain relief with heat and e stim. He has had slow progress due to moderate/high pain levels throughout therapy. Patients progression toward goals is as follows:  []     Improving appropriately and progressing toward goals  [x]     Improving slowly and progressing toward goals  []     Not making progress toward goals and plan of care will be adjusted    PLAN OF CARE:   Patient continues to benefit from skilled intervention to address the above impairments. [x]    Continue treatment per established plan of care.   []     Recommend the following changes to the plan of care    Recommendations/Intent for next treatment: IASTM and STM as able     Harjinder Romero, PT   Time Calculation: 50 mins  Patient Time in clinic:   Start Time: 1400   Stop Time: 1450

## 2019-03-25 ENCOUNTER — HOSPITAL ENCOUNTER (OUTPATIENT)
Dept: PHYSICAL THERAPY | Age: 59
Discharge: HOME | End: 2019-03-25
Payer: COMMERCIAL

## 2019-03-25 PROCEDURE — 97014 ELECTRIC STIMULATION THERAPY: CPT

## 2019-03-25 PROCEDURE — 97110 THERAPEUTIC EXERCISES: CPT

## 2019-03-25 NOTE — PROGRESS NOTES
[facility]  [address], [address] [facility]    OUTPATIENT physical Therapy DAILY Treatment NOTe  Visit: 20    NAME: Natalia Mckenzie AGE: 66 y.o. GENDER: male  DATE: 3/25/2019  REFERRING PHYSICIAN: Ángel Barragan MD      GOALS  Short term goals  Time frame: 3 weeks  1. Patient will be compliant and independent with the initial HEP as evidenced by being able to perform without cuing. MET  2. Patient will report a 50% improvement in symptoms. MET  3. Patient report a 50% improvement in sleeping. MET  4. Patient will tolerate 15 minutes of clinic activities before onset of symptoms. MET     Long term goals  Time frame: 6 weeks  1. Patient will report pain level decrease to 2/10 to allow increased ease of movement. 2. Patient will have an improved score on the TXU Javan outcome measure by 12 points to demonstrate an increase in functional activity tolerance. 3. Patient will be independent in final individualized HEP. 4. Patient will sleep 6-8 hours without being interrupted by pain. SUBJECTIVE:   Pt out of work as of last week, until April 4th    Pain in: Neck 6.5/10; Back 6.5/10    OBJECTIVE DATA SUMMARY:   Objective data from initial evaluation:  Pain:  Location: Low back, neck and left shoulder and buttocks  Quality: aching, burning and throbbing  Now: 7/10  Worst: 10/10  Factors that improve pain: medication: Ibuprofen, Motrin used and beneficial     Posture:   Pt holds himself very stiffly     Strength:   Not tested d/t pain     Range of Motion:   Trunk ROM  Flexion 25% of normal  Extension 25% of normal  SB B/L 10%     Neck ROM  Flexion/Extension 10% of normal  Rotation 10%     Spinal Assessment:   Flattened lumbar spine     Joint Mobility:   Very TTP over spinous processes lumbar spine, B/L PSIS,     Palpation:    Left gluteal muscles. B/L lumbar paraspinals,B/L UT,Rhomboids    Neurologic Assessment:               Tone:  WNL               Sensation: normal Reflexes: not tested     Special Tests:   Not tested     Mobility:               Transitional Movements: Antalgic                Gait: antalgic     Balance: WNL     Functional Measure:   Yousuf Beard: 17/24 2/19/2019 Fransisco Srinivasan: 18/24     TREATMENT/INTERVENTION:  Modalities (Rationale): to decrease pain and muscle guarding  MHP and e stim to bilateral neck and low back for 15 minutes in supine at end of session; no skin irritation noted     Therapeutic Exercises:  HEP: Cx ROM, Shoulder shrug, Flexion stretch over table, KTC, PPT, LTR, shoulder flexion using cane  Additional exercises, HEP  reviewed 3/25 Angry Cat, UE and LE extension on all 4's, child's pose, thoracolumbar side stretch, Flex/ext over back of chair, HS stretch, Upper body twist in supine, bridges, figure 4 piriformis stretch. Clinic exercises in BOLD performed this date:    Cervical spine AROM rotation and side bend: 10 reps  Lower cervical/upper thoracic stretch: 3 reps with 10 second holds  Flexion stretch over physioball: 10 reps  Seated trunk rotation: 5 reps each side   Seated trunk flexion stretch: 5 reps  Seated trunk flexion/extension vertical towel roll x 7 reps  Seated Trunk SB stretch: 5 reps    SKTC: 5 reps B  Bridges with adductor squeeze (ball)  : 2 sets of 5 reps  LTR: 10 reps   Pelvic tilts: 5 reps  Piriformis stretch figure 4 x 2 reps B  Hip adductor stretch: 2 reps  Hip adduction with ball: 10 reps  BKTC yellow ball x 10 reps    Standing   Rows 25#: 10 reps   Pull downs 25# x: 10 reps  Hip abduction and extension (yellow TB resistance)10 reps each direction B/L  Band walks red TB 15' x 2 each direction  HS stretch 3 reps 20 sec hold    All 4's  Angry Cat x 5 reps  Child's pose 3 reps with 15 sec hold  UE ext, 5 reps  LE ext 5 reps    LBE/UBE x 3 minute; Resistance 3.5, pt self limits time    Manual Therapy:  Grade 1 IASTM to B/L UT, levator, low tolerance  Procedure explained to pt and he expressed understanding. Gentle STM to bilateral UT   STM lumbar paraspinals and left gluteal muscles using green weighted ball, very low tolerance    Activity tolerance and post treatment pain report:   Fair; improved tolerance to exercises  Pain Out: 4/10    Education:  Education was provided to the patient on the following topics:.  []    No changes were made to the home exercise program.  [x]    The following changes were made to the home exercise program: continue HEP, and perform self massage in shower to neck musculature  Patient verbalized understanding of the topics presented. ASSESSMENT:   Patient presents with 6.5/10 pain in neck and low back. Pt had returned to work but Dr Andrew Peres took him out last week until April 4th, \"I was relapsing\". Patient is able to tolerate clinic exercises with less rest breaks. He continues to be tender to palpation in bilateral UT, levator scapulae, rhomboids, IASTM this date, assess response. Discussed with pt that he is nearing max benefit from PT and that we will work on work simulation exercises. Encouraged him to increase aerobic activity a long with expanded HEP which was given to him today . Patients progression toward goals is as follows:  []     Improving appropriately and progressing toward goals  [x]     Improving slowly and progressing toward goals  []     Not making progress toward goals and plan of care will be adjusted    PLAN OF CARE:   Patient continues to benefit from skilled intervention to address the above impairments. [x]    Continue treatment per established plan of care.   []     Recommend the following changes to the plan of care    Recommendations/Intent for next treatment: Assess response to IASTM, begin work simulation    Ivett Addison PT   Time Calculation: 55 mins  Patient Time in clinic:   Start Time: 0910   Stop Time: [phone]

## 2019-03-29 ENCOUNTER — HOSPITAL ENCOUNTER (OUTPATIENT)
Dept: PHYSICAL THERAPY | Age: 59
Discharge: HOME | End: 2019-03-29
Payer: COMMERCIAL

## 2019-03-29 PROCEDURE — 97014 ELECTRIC STIMULATION THERAPY: CPT | Performed by: PHYSICAL THERAPIST

## 2019-03-29 PROCEDURE — 97110 THERAPEUTIC EXERCISES: CPT | Performed by: PHYSICAL THERAPIST

## 2019-03-29 NOTE — PROGRESS NOTES
[facility]  [address], [address] [facility]    OUTPATIENT physical Therapy DAILY Treatment NOTe  Visit: 21    NAME: Cele Jack AGE: 66 y.o. GENDER: male  DATE: 3/29/2019  REFERRING PHYSICIAN: Naa Lawson MD      GOALS  Short term goals  Time frame: 3 weeks  1. Patient will be compliant and independent with the initial HEP as evidenced by being able to perform without cuing. MET  2. Patient will report a 50% improvement in symptoms. MET  3. Patient report a 50% improvement in sleeping. MET  4. Patient will tolerate 15 minutes of clinic activities before onset of symptoms. MET     Long term goals  Time frame: 6 weeks  1. Patient will report pain level decrease to 2/10 to allow increased ease of movement. 2. Patient will have an improved score on the TXU Javan outcome measure by 12 points to demonstrate an increase in functional activity tolerance. 3. Patient will be independent in final individualized HEP. 4. Patient will sleep 6-8 hours without being interrupted by pain. SUBJECTIVE:   \"It's okay. Still hurts. \"    Pain in: Neck 5/10; Back 5/10    OBJECTIVE DATA SUMMARY:   Objective data from initial evaluation:  Pain:  Location: Low back, neck and left shoulder and buttocks  Quality: aching, burning and throbbing  Now: 7/10  Worst: 10/10  Factors that improve pain: medication: Ibuprofen, Motrin used and beneficial     Posture:   Pt holds himself very stiffly     Strength:   Not tested d/t pain     Range of Motion:   Trunk ROM  Flexion 25% of normal  Extension 25% of normal  SB B/L 10%     Neck ROM  Flexion/Extension 10% of normal  Rotation 10%     Spinal Assessment:   Flattened lumbar spine     Joint Mobility:   Very TTP over spinous processes lumbar spine, B/L PSIS,     Palpation:    Left gluteal muscles. B/L lumbar paraspinals,B/L UT,Rhomboids    Neurologic Assessment:               Tone:  WNL               Sensation: normal               Reflexes: not tested     Special Tests:   Not tested     Mobility:               Transitional Movements: Antalgic                Gait: antalgic     Balance: WNL     Functional Measure:   Patricia Dolores: 17/24 2/19/2019 Fransisco Srinivasan: 18/24     TREATMENT/INTERVENTION:  Modalities (Rationale): to decrease pain and muscle guarding  MHP and e stim to bilateral neck and low back for 15 minutes in supine at end of session; no skin irritation noted     Therapeutic Exercises:  HEP: Cx ROM, Shoulder shrug, Flexion stretch over table, KTC, PPT, LTR, shoulder flexion using cane  Additional exercises, HEP  reviewed 3/25 Angry Cat, UE and LE extension on all 4's, child's pose, thoracolumbar side stretch, Flex/ext over back of chair, HS stretch, Upper body twist in supine, bridges, figure 4 piriformis stretch. Clinic exercises in BOLD performed this date:    Cervical spine AROM rotation and side bend: 10 reps  Lower cervical/upper thoracic stretch: 3 reps with 10 second holds  Flexion stretch over physioball: 10 reps  Seated trunk rotation: 5 reps each side   Seated trunk flexion stretch: 5 reps  Seated trunk flexion/extension vertical towel roll x 7 reps  Seated Trunk SB stretch: 5 reps    SKTC: 5 reps B  Bridges with adductor squeeze (ball)  : 2 sets of 5 reps  LTR: 10 reps   Pelvic tilts: 5 reps  Piriformis stretch figure 4 x 2 reps B  Hip adductor stretch: 2 reps  Hip adduction with ball: 10 reps  BKTC yellow ball x 10 reps    Standing   Rows 25#: 10 reps   Pull downs 30# x: 10 reps  Hip extension with green TB: 10 reps B  Side steps with green TB: 10 steps x 2  HS stretch 3 reps 20 sec hold    All 4's  Angry Cat x 5 reps  Child's pose 3 reps with 15 sec hold  UE ext, 5 reps  LE ext 5 reps    LBE/UBE x5 minute; Resistance 1 with MHP at low back    Manual Therapy:  Grade 1 IASTM to B/L UT, levator, low tolerance  Procedure explained to pt and he expressed understanding.    Gentle STM to bilateral UT   STM lumbar paraspinals and left gluteal muscles using green weighted ball, very low tolerance    Activity tolerance and post treatment pain report:   Fair; improved tolerance to exercises  Pain Out: 4/10    Education:  Education was provided to the patient on the following topics:.  []    No changes were made to the home exercise program.  [x]    The following changes were made to the home exercise program: continue HEP, and perform self massage in shower to neck musculature  Patient verbalized understanding of the topics presented. ASSESSMENT:   Patient presents with 5/10 pain in neck and low back. He reports that he thinks returning to work before had increased his pain levels and set him back. Patient to start back at work on April 4th as . Patient is able to tolerate clinic exercises fairly; continues to move slowly and require rest breaks between each exercise. He is able to tolerate more clinic exercises, repetition, and resistance than when he started therapy. He continues to be tender to palpation in bilateral UT, levator scapulae, and rhomboids. He reported that he was sore after IASTM initially after last session, but does present with decreased pain levels this session. Patient appears to be close to maximum benefit from physical therapy and that we will work on work simulation exercises as able. Discussed home TENS unit to manage pain as well. Encouraged him to increase aerobic activity and exercises in order to  improve pain and mobility. Patients progression toward goals is as follows:  []     Improving appropriately and progressing toward goals  [x]     Improving slowly and progressing toward goals  []     Not making progress toward goals and plan of care will be adjusted    PLAN OF CARE:   Patient continues to benefit from skilled intervention to address the above impairments. [x]    Continue treatment per established plan of care.   []     Recommend the following changes to the plan of care    Recommendations/Intent for next treatment: work simulation    Estela Alonso, PT   Time Calculation: 55 mins  Patient Time in clinic:   Start Time: [place]   Stop Time: 0930

## 2019-04-04 ENCOUNTER — HOSPITAL ENCOUNTER (OUTPATIENT)
Dept: PHYSICAL THERAPY | Age: 59
Discharge: HOME | End: 2019-04-04
Payer: COMMERCIAL

## 2019-04-04 PROCEDURE — 97110 THERAPEUTIC EXERCISES: CPT | Performed by: PHYSICAL THERAPIST

## 2019-04-04 PROCEDURE — 97014 ELECTRIC STIMULATION THERAPY: CPT | Performed by: PHYSICAL THERAPIST

## 2019-04-04 NOTE — PROGRESS NOTES
[facility]  [address], [address] [facility]    OUTPATIENT physical Therapy DAILY Treatment NOTe with discharge  Visit: 22    NAME: Gonzalo Conley AGE: 66 y.o. GENDER: male  DATE: 4/4/2019  REFERRING PHYSICIAN: Sai Wang MD      GOALS  Short term goals  Time frame: 3 weeks  1. Patient will be compliant and independent with the initial HEP as evidenced by being able to perform without cuing. MET  2. Patient will report a 50% improvement in symptoms. MET  3. Patient report a 50% improvement in sleeping. MET  4. Patient will tolerate 15 minutes of clinic activities before onset of symptoms. MET     Long term goals  Time frame: 6 weeks  1. Patient will report pain level decrease to 2/10 to allow increased ease of movement. NOT MET  2. Patient will have an improved score on the TXU Javan outcome measure by 12 points to demonstrate an increase in functional activity tolerance. NOT MET  3. Patient will be independent in final individualized HEP. MET  4. Patient will sleep 6-8 hours without being interrupted by pain. NOT MET       SUBJECTIVE:   \"It's still stiff. \"    Patient 15 minutes late to session.      Pain in: Neck 5/10; Back 6/10    OBJECTIVE DATA SUMMARY:     Pain:  Location: Low back and neck  Quality: aching and throbbing  Now: 5-6/10  Worst: 9/10  Factors that improve pain: medication: Ibuprofen, Motrin used and beneficial     Posture:   Patient continues to hold himself stiffly     Strength:   BUE WNL     Range of Motion:  Lumbar Spine (AROM)  (*Measured 3rd finger from the floor)  Flexion  8\"; pain  Extension 25% limited; pain  R side bend WNL; stretch  L side bend WNL; stretch  R rotation WNL; stretch  L rotation WNL; stretch    Cervical Spine (AROM)  Flexion  10% limited; stretch  Extension WNL  R side bend 25% limited; stretch  L side bend 25% limited; stretch  R rotation 10% limited; stretch  L rotation 10% limited; stretch     Spinal Assessment:   Flattened lumbar spine     Palpation:   Tender at bilateral UT, levator scapulae, cervical paraspinals, rhomboids, thoracic/lumbar paraspinals, and piriformis    Neurologic Assessment:               Tone: WNL               Sensation: normal               Reflexes: not tested     Special Tests:   (-) Spurlings     Mobility:               Transitional Movements: Increased time to perform                Gait: Slow pace     Balance: WNL     Functional Measure:   Natasha [place]: 17/24 on evalaution  Natasha [place] 2/19/19: 18/24  Natasha [place] 4/4/19: 18/24 on discharge     TREATMENT/INTERVENTION:  Modalities (Rationale): to decrease pain and muscle guarding  MHP and e stim to bilateral neck and low back for 15 minutes in supine at end of session; no skin irritation noted     Therapeutic Exercises:  HEP: Cx ROM, Shoulder shrug, Flexion stretch over table, KTC, PPT, LTR, shoulder flexion using cane  Additional exercises, HEP  reviewed 3/25 Angry Cat, UE and LE extension on all 4's, child's pose, thoracolumbar side stretch, Flex/ext over back of chair, HS stretch, Upper body twist in supine, bridges, figure 4 piriformis stretch.     Clinic exercises in BOLD performed this date:    Cervical spine AROM rotation and side bend: 10 reps  Lower cervical/upper thoracic stretch: 3 reps with 10 second holds  Flexion stretch over physioball: 10 reps  Seated trunk rotation: 5 reps each side   Seated trunk flexion stretch: 5 reps  Seated trunk flexion/extension vertical towel roll x 7 reps  Seated Trunk SB stretch: 5 reps    SKTC: 5 reps B  Bridges with adductor squeeze (ball)  : 2 sets of 5 reps  LTR: 10 reps   Pelvic tilts: 5 reps  Piriformis stretch figure 4 x 2 reps B  Hip adductor stretch: 2 reps  Hip adduction with ball: 10 reps  BKTC yellow ball x 10 reps    Standing   Rows 25#: 10 reps   Pull downs 30# x: 10 reps  Hip extension with green TB: 10 reps B  Side steps with green TB: 10 steps x 2  HS stretch 3 reps 20 sec hold    All 4's  Angry Cat x 5 reps  Child's pose 3 reps with 15 sec hold  UE ext, 5 reps  LE ext 5 reps    LBE/UBE x5 minute; Resistance 1     Manual Therapy:  Grade 1 IASTM to B/L UT, levator, low tolerance  Procedure explained to pt and he expressed understanding. Gentle STM to bilateral UT   STM lumbar paraspinals and left gluteal muscles using green weighted ball, very low tolerance    Activity tolerance and post treatment pain report:   Fair; increased rest breaks continue to be required during exercises  Pain Out: 4/10    Education:  Education was provided to the patient on the following topics:.  []    No changes were made to the home exercise program.  [x]    The following changes were made to the home exercise program: continue HEP  Patient verbalized understanding of the topics presented. ASSESSMENT:   Patient discharged from physical therapy after 22 visits from 1/9/19 to 4/4/19. Patient continues to present with 5/10 pain in neck and 6/10 in low back. Patient reports that Dr. Percy Simpson took him out of work for an additional two weeks. Patient is able to tolerate clinic exercises fairly; continues to move slowly and require frequent rest breaks. He continues to be tender to palpation in bilateral UT, levator scapulae, rhomboids, and lumbar paraspinals. Patient has reached maximal benefit from physical therapy at this time due to lack of progress. No improvement in [address] [place] [address] outcome measure from evaluation. Patient agreeable with discharge. Provided patient with information on work conditioning/hardening program at [facility] to focus on return to work activities. PLAN OF CARE:   Patient will be discharged from physical therapy at this time.   Criteria for termination of care:  []   Goals Achieved  [x]   Plateau Reached  []   Patient has not returned  []   Other: provided patient with information on work conditioning/hardening program at [facility] for follow up, continuing care, or equipment recommendations:  Instructed to continue HEP and provided patient with information on work conditioning/hardening program for return to work  Thank you for this referral.    Gina Shea, PT   Time Calculation: 23 mins  Patient Time in clinic:   Start Time: 0845   Stop Time: 3817

## 2019-04-24 ENCOUNTER — HOSPITAL ENCOUNTER (OUTPATIENT)
Dept: MRI IMAGING | Age: 59
Discharge: HOME | End: 2019-04-24
Attending: ORTHOPAEDIC SURGERY
Payer: COMMERCIAL

## 2019-04-24 DIAGNOSIS — M50.30 DDD (DEGENERATIVE DISC DISEASE), CERVICAL: ICD-10-CM

## 2019-04-24 DIAGNOSIS — M51.36 DDD (DEGENERATIVE DISC DISEASE), LUMBAR: ICD-10-CM

## 2019-04-24 DIAGNOSIS — M54.2 CERVICALGIA: ICD-10-CM

## 2019-04-24 DIAGNOSIS — S39.012A STRAIN OF BACK, INITIAL ENCOUNTER: ICD-10-CM

## 2019-04-24 DIAGNOSIS — M54.9 UPPER BACK PAIN: ICD-10-CM

## 2019-04-24 DIAGNOSIS — M54.50 LOW BACK PAIN WITH RADIATION: ICD-10-CM

## 2019-04-24 PROCEDURE — 72148 MRI LUMBAR SPINE W/O DYE: CPT

## 2019-04-25 ENCOUNTER — HOSPITAL ENCOUNTER (EMERGENCY)
Age: 59
Discharge: HOME | End: 2019-04-25
Attending: EMERGENCY MEDICINE
Payer: COMMERCIAL

## 2019-04-25 ENCOUNTER — APPOINTMENT (OUTPATIENT)
Dept: CT IMAGING | Age: 59
End: 2019-04-25
Attending: PHYSICIAN ASSISTANT
Payer: COMMERCIAL

## 2019-04-25 VITALS
BODY MASS INDEX: 25.91 KG/M2 | SYSTOLIC BLOOD PRESSURE: 117 MMHG | WEIGHT: 171 LBS | TEMPERATURE: 99.2 F | RESPIRATION RATE: 17 BRPM | DIASTOLIC BLOOD PRESSURE: 65 MMHG | HEIGHT: 68 IN | HEART RATE: 93 BPM | OXYGEN SATURATION: 98 %

## 2019-04-25 DIAGNOSIS — N12 PYELONEPHRITIS: Primary | ICD-10-CM

## 2019-04-25 DIAGNOSIS — Z20.2 POSSIBLE EXPOSURE TO STD: ICD-10-CM

## 2019-04-25 LAB
APPEARANCE UR: ABNORMAL
BACTERIA URNS QL MICRO: NEGATIVE /HPF
BILIRUB UR QL: NEGATIVE
COLOR UR: ABNORMAL
EPITH CASTS URNS QL MICRO: ABNORMAL /LPF
GLUCOSE UR STRIP.AUTO-MCNC: NEGATIVE MG/DL
HGB UR QL STRIP: ABNORMAL
KETONES UR QL STRIP.AUTO: NEGATIVE MG/DL
LEUKOCYTE ESTERASE UR QL STRIP.AUTO: ABNORMAL
NITRITE UR QL STRIP.AUTO: NEGATIVE
PH UR STRIP: 6.5 [PH] (ref 5–8)
PROT UR STRIP-MCNC: 100 MG/DL
RBC #/AREA URNS HPF: ABNORMAL /HPF (ref 0–5)
SP GR UR REFRACTOMETRY: 1.02 (ref 1–1.03)
UA: UC IF INDICATED,UAUC: ABNORMAL
UROBILINOGEN UR QL STRIP.AUTO: 1 EU/DL (ref 0.2–1)
WBC URNS QL MICRO: ABNORMAL /HPF (ref 0–4)

## 2019-04-25 PROCEDURE — 87186 SC STD MICRODIL/AGAR DIL: CPT

## 2019-04-25 PROCEDURE — 74011250636 HC RX REV CODE- 250/636: Performed by: PHYSICIAN ASSISTANT

## 2019-04-25 PROCEDURE — 99283 EMERGENCY DEPT VISIT LOW MDM: CPT

## 2019-04-25 PROCEDURE — 74011250637 HC RX REV CODE- 250/637: Performed by: PHYSICIAN ASSISTANT

## 2019-04-25 PROCEDURE — 87077 CULTURE AEROBIC IDENTIFY: CPT

## 2019-04-25 PROCEDURE — 74176 CT ABD & PELVIS W/O CONTRAST: CPT

## 2019-04-25 PROCEDURE — 81001 URINALYSIS AUTO W/SCOPE: CPT

## 2019-04-25 PROCEDURE — 87086 URINE CULTURE/COLONY COUNT: CPT

## 2019-04-25 PROCEDURE — 87491 CHLMYD TRACH DNA AMP PROBE: CPT

## 2019-04-25 PROCEDURE — 96372 THER/PROPH/DIAG INJ SC/IM: CPT

## 2019-04-25 RX ORDER — NAPROXEN 250 MG/1
500 TABLET ORAL
Status: COMPLETED | OUTPATIENT
Start: 2019-04-25 | End: 2019-04-25

## 2019-04-25 RX ORDER — AZITHROMYCIN 500 MG/1
1000 TABLET, FILM COATED ORAL
Status: COMPLETED | OUTPATIENT
Start: 2019-04-25 | End: 2019-04-25

## 2019-04-25 RX ORDER — CEPHALEXIN 500 MG/1
500 CAPSULE ORAL 3 TIMES DAILY
Qty: 30 CAP | Refills: 0 | Status: SHIPPED | OUTPATIENT
Start: 2019-04-25 | End: 2019-05-05

## 2019-04-25 RX ADMIN — LIDOCAINE HYDROCHLORIDE 250 MG: 10 INJECTION, SOLUTION EPIDURAL; INFILTRATION; INTRACAUDAL; PERINEURAL at 14:42

## 2019-04-25 RX ADMIN — NAPROXEN 500 MG: 250 TABLET ORAL at 13:58

## 2019-04-25 RX ADMIN — AZITHROMYCIN 1000 MG: 500 TABLET, FILM COATED ORAL at 14:43

## 2019-04-25 NOTE — DISCHARGE INSTRUCTIONS
Patient Education        Kidney Infection: Care Instructions  Your Care Instructions    A kidney infection (pyelonephritis) is a type of urinary tract infection, or UTI. Most UTIs are bladder infections. Kidney infections tend to make people much sicker than bladder infections do. A kidney infection is also more serious because it can cause lasting damage if it is not treated quickly. Follow-up care is a key part of your treatment and safety. Be sure to make and go to all appointments, and call your doctor if you are having problems. It's also a good idea to know your test results and keep a list of the medicines you take. How can you care for yourself at home? · Take your antibiotics as directed. Do not stop taking them just because you feel better. You need to take the full course of antibiotics. · Drink plenty of water, enough so that your urine is light yellow or clear like water. This may help wash out bacteria that are causing the infection. If you have kidney, heart, or liver disease and have to limit fluids, talk with your doctor before you increase the amount of fluids you drink. · Urinate often. Try to empty your bladder each time. · To relieve pain, take a hot shower or lay a heating pad (set on low) over your lower belly. Never go to sleep with a heating pad in place. Put a thin cloth between the heating pad and your skin. To help prevent kidney infections  · Drink plenty of water each day. This helps you urinate often, which clears bacteria from your system. If you have kidney, heart, or liver disease and have to limit fluids, talk with your doctor before you increase the amount of fluids you drink. · Urinate when you have the urge. Do not hold your urine for a long time. Urinate before you go to sleep. · If you have symptoms of a bladder infection, such as burning when you urinate or having to urinate often, call your doctor so you can treat the problem before it gets worse.  If you do not treat a bladder infection quickly, it can spread to the kidney. · Men should keep the tip of the penis clean. If you are a woman, keep these ideas in mind:  · Urinate right after you have sex. · Change sanitary pads often. Avoid douches, feminine hygiene sprays, and other feminine hygiene products that have deodorants. · After going to the bathroom, wipe from front to back. When should you call for help? Call your doctor now or seek immediate medical care if:    · You have symptoms that a kidney infection is getting worse. These may include:  ? Pain or burning when you urinate. ? A frequent need to urinate without being able to pass much urine. ? Pain in the flank, which is just below the rib cage and above the waist on either side of the back. ? Blood in the urine. ? A fever.     · You are vomiting or nauseated.    Watch closely for changes in your health, and be sure to contact your doctor if:    · You do not get better as expected. Where can you learn more? Go to http://portillo-terry.info/. Enter N608 in the search box to learn more about \"Kidney Infection: Care Instructions. \"  Current as of: March 14, 2018  Content Version: 11.9  © 7688-7135 [employer]. Care instructions adapted under license by [employer] (which disclaims liability or warranty for this information). If you have questions about a medical condition or this instruction, always ask your healthcare professional. [place] [address] any warranty or liability for your use of this information.

## 2019-04-25 NOTE — ED PROVIDER NOTES
EMERGENCY DEPARTMENT HISTORY AND PHYSICAL EXAM      Date: 4/25/2019  Patient Name: Cele Jack    History of Presenting Illness     Chief Complaint   Patient presents with    Blood in Urine     pt c/o penile pain,blood in urine,back pain since this morning. History Provided By: Patient and pts girlfriend    HPI: Cele Jack, 64 y.o. male with no  PMHx , presents to the ED with cc of hematuria,dysuria and right flank pain since last night. States the pain comes from his back and radiates to his front. Pt stases he had similar symptoms when he had trichomonas but without the flank pain. Pt is concerned for stds. He denies any trauma to his back, abd pain, penile discharge, testicular swelling or pain, fevers, chills, nausea, vomiting, chest pain, shortness of breath, headache, rash, diarrhea, sweating or weight loss. All other ROS negative at this time  Pt is in no acute distress and is speaking in full sentences      There are no other complaints, changes, or physical findings at this time. Social History     Tobacco Use    Smoking status: Never Smoker    Smokeless tobacco: Never Used   Substance Use Topics    Alcohol use: Yes    Drug use: No       No Known Allergies      PCP: Hugh Garcia MD    No current facility-administered medications on file prior to encounter. Current Outpatient Medications on File Prior to Encounter   Medication Sig Dispense Refill    alprazolam (XANAX PO) Take  by mouth.  neomycin-polymyxin-dexamethasone (MAXITROL) ophthalmic suspension Administer 1 Drop to both eyes four (4) times daily. 1 Bottle 0    butalbital-acetaminophen-caffeine (FIORICET) -40 mg per tablet Take 1 Tab by mouth every six (6) hours as needed for Pain or Headache. Max Daily Amount: 4 Tabs. 15 Tab 0    ergocalciferol (VITAMIN D2) 50,000 unit capsule Take 50,000 Units by mouth. Past History     Past Medical History:  History reviewed.  No pertinent past medical history. Past Surgical History:  Past Surgical History:   Procedure Laterality Date    HX GI      hernia with mesh repair       Family History:  History reviewed. No pertinent family history. Social History:  Social History     Tobacco Use    Smoking status: Never Smoker    Smokeless tobacco: Never Used   Substance Use Topics    Alcohol use: Yes    Drug use: No       Allergies:  No Known Allergies      Review of Systems   Review of Systems   Constitutional: Negative. Negative for chills and fever. HENT: Negative. Eyes: Negative. Respiratory: Negative. Negative for shortness of breath. Cardiovascular: Negative. Negative for chest pain. Gastrointestinal: Negative. Negative for abdominal pain, diarrhea, nausea and vomiting. Endocrine: Negative. Genitourinary: Positive for dysuria, flank pain, hematuria and penile pain. Negative for decreased urine volume, discharge, frequency, genital sores, penile swelling, scrotal swelling, testicular pain and urgency. Skin: Negative. Allergic/Immunologic: Negative. Neurological: Negative. Negative for headaches. Hematological: Negative. Psychiatric/Behavioral: Negative. All other systems reviewed and are negative. Physical Exam   Physical Exam   Constitutional: He is oriented to person, place, and time. He appears well-developed and well-nourished. No distress. HENT:   Head: Normocephalic and atraumatic. Right Ear: External ear normal.   Left Ear: External ear normal.   Nose: Nose normal.   Mouth/Throat: Oropharynx is clear and moist.   Eyes: Pupils are equal, round, and reactive to light. Conjunctivae and EOM are normal.   Neck: Normal range of motion. Neck supple. Cardiovascular: Normal rate, regular rhythm, normal heart sounds and intact distal pulses. Pulmonary/Chest: Effort normal and breath sounds normal. No respiratory distress. He has no wheezes. He has no rales. Abdominal: Soft.  Normal appearance and bowel sounds are normal. He exhibits no distension. There is no tenderness. There is CVA tenderness (right). There is no rebound, no guarding, no tenderness at McBurney's point and negative Easley's sign. Musculoskeletal: Normal range of motion. He exhibits no edema or tenderness. Lymphadenopathy:     He has no cervical adenopathy. Neurological: He is alert and oriented to person, place, and time. He has normal reflexes. He displays normal reflexes. No cranial nerve deficit. He exhibits normal muscle tone. Coordination normal.   Skin: No rash noted. He is not diaphoretic. Psychiatric: He has a normal mood and affect. His behavior is normal. Judgment and thought content normal.   Nursing note and vitals reviewed. Diagnostic Study Results     Labs -     Recent Results (from the past 12 hour(s))   URINALYSIS W/ REFLEX CULTURE    Collection Time: 04/25/19 12:57 PM   Result Value Ref Range    Color YELLOW/STRAW      Appearance CLOUDY (A) CLEAR      Specific gravity 1.020 1.003 - 1.030      pH (UA) 6.5 5.0 - 8.0      Protein 100 (A) NEG mg/dL    Glucose NEGATIVE  NEG mg/dL    Ketone NEGATIVE  NEG mg/dL    Bilirubin NEGATIVE  NEG      Blood LARGE (A) NEG      Urobilinogen 1.0 0.2 - 1.0 EU/dL    Nitrites NEGATIVE  NEG      Leukocyte Esterase LARGE (A) NEG      WBC 20-50 0 - 4 /hpf    RBC  0 - 5 /hpf    Epithelial cells FEW FEW /lpf    Bacteria NEGATIVE  NEG /hpf    UA:UC IF INDICATED URINE CULTURE ORDERED (A) CNI         Radiologic Studies -   CT ABD PELV WO CONT   Final Result   IMPRESSION:      1. No urinary tract calculi or hydronephrosis. No acute findings demonstrated in   the abdomen or pelvis. 2. Prostatic enlargement. 3. Diffuse thickening of the wall of the urinary bladder. CT Results  (Last 48 hours)               04/25/19 1350  CT ABD PELV WO CONT Final result    Impression:  IMPRESSION:       1. No urinary tract calculi or hydronephrosis.  No acute findings demonstrated in   the abdomen or pelvis. 2. Prostatic enlargement. 3. Diffuse thickening of the wall of the urinary bladder. Narrative:  EXAM: CT ABD PELV WO CONT       INDICATION: right flank pain , onset this morning, hematuria       COMPARISON: None       CONTRAST:  None. TECHNIQUE:    Thin axial images were obtained through the abdomen and pelvis. Coronal and   sagittal reconstructions were generated. Oral contrast was not administered. CT   dose reduction was achieved through use of a standardized protocol tailored for   this examination and automatic exposure control for dose modulation. The absence of intravenous contrast material reduces the sensitivity for   evaluation of the solid parenchymal organs of the abdomen. FINDINGS:    LUNG BASES: Clear. INCIDENTALLY IMAGED HEART AND MEDIASTINUM: Unremarkable. LIVER: No mass or biliary dilatation. GALLBLADDER: No calcified stones or adjacent inflammatory change. SPLEEN: No mass. Normal size. Few calcified granulomata. PANCREAS: No mass or ductal dilatation. ADRENALS: Unremarkable. KIDNEYS/URETERS: No mass, calculus, or hydronephrosis. STOMACH: Unremarkable. SMALL BOWEL: No dilatation or wall thickening. COLON: No dilatation or wall thickening. APPENDIX: Nondistended. Retrocecal.   PERITONEUM: No ascites or pneumoperitoneum. RETROPERITONEUM: No lymphadenopathy or aortic aneurysm. REPRODUCTIVE ORGANS: Moderate prostatic enlargement. URINARY BLADDER: Diffuse thickening of the wall of the urinary bladder. No   calculi. BONES: No destructive bone lesion. Degenerative changes in the lower lumbar   spine. Bilateral hip osteoarthritis. ADDITIONAL COMMENTS: N/A               CXR Results  (Last 48 hours)    None            Medical Decision Making   I am the first provider for this patient. I reviewed the vital signs, available nursing notes, past medical history, past surgical history, family history and social history.     Vital Signs-Reviewed the patient's vital signs. Patient Vitals for the past 12 hrs:   Temp Pulse Resp BP SpO2   04/25/19 1135 99.2 °F (37.3 °C) 93 17 117/65 98 %         Records Reviewed: Nursing Notes, Old Medical Records, Previous Radiology Studies and Previous Laboratory Studies    Provider Notes (Medical Decision Making):   DDX: uti, pylo, kidney stone, std, hydronephrosis   Advised pt to abstain from intercourse for the next 10 days- pt states that he might die if he abstains. .. Worsening si/sxs discussed extensively   Follow up with PCP or RTC if symptoms/signs worsen  Side effects of medication discussed  Education materials provided at discharge   Pt verbalizes agreement with plan      ED Course:   Initial assessment performed. The patients presenting problems have been discussed, and they are in agreement with the care plan formulated and outlined with them. I have encouraged them to ask questions as they arise throughout their visit. Disposition:  Discharge     Care plan outlined and precautions discussed. Patient has no new complaints, changes, or physical findings. Results of visit were reviewed with the patient. All medications were reviewed with the patient; will d/c home. All of pt's questions and concerns were addressed. Patient was instructed and agrees to follow up with pcp, as well as to return to the ED upon further deterioration. Patient is ready to go home. Diagnosis     Clinical Impression:   1. Pyelonephritis    2.  Possible exposure to STD

## 2019-04-26 LAB
C TRACH DNA SPEC QL NAA+PROBE: NEGATIVE
N GONORRHOEA DNA SPEC QL NAA+PROBE: NEGATIVE
SAMPLE TYPE: NORMAL
SERVICE CMNT-IMP: NORMAL
SPECIMEN SOURCE: NORMAL

## 2019-04-27 LAB
BACTERIA SPEC CULT: ABNORMAL
CC UR VC: ABNORMAL
SERVICE CMNT-IMP: ABNORMAL

## 2019-07-11 ENCOUNTER — HOSPITAL ENCOUNTER (OUTPATIENT)
Dept: INTERVENTIONAL RADIOLOGY/VASCULAR | Age: 59
Discharge: HOME | End: 2019-07-11
Attending: STUDENT IN AN ORGANIZED HEALTH CARE EDUCATION/TRAINING PROGRAM | Admitting: STUDENT IN AN ORGANIZED HEALTH CARE EDUCATION/TRAINING PROGRAM
Payer: MEDICAID

## 2019-07-11 VITALS
WEIGHT: 167 LBS | SYSTOLIC BLOOD PRESSURE: 106 MMHG | BODY MASS INDEX: 25.31 KG/M2 | RESPIRATION RATE: 18 BRPM | TEMPERATURE: 98.2 F | HEIGHT: 68 IN | OXYGEN SATURATION: 99 % | HEART RATE: 72 BPM | DIASTOLIC BLOOD PRESSURE: 60 MMHG

## 2019-07-11 DIAGNOSIS — M51.26 HNP (HERNIATED NUCLEUS PULPOSUS), LUMBAR: ICD-10-CM

## 2019-07-11 DIAGNOSIS — M48.062 LUMBAR STENOSIS WITH NEUROGENIC CLAUDICATION: ICD-10-CM

## 2019-07-11 PROCEDURE — 74011636320 HC RX REV CODE- 636/320: Performed by: STUDENT IN AN ORGANIZED HEALTH CARE EDUCATION/TRAINING PROGRAM

## 2019-07-11 PROCEDURE — 74011250636 HC RX REV CODE- 250/636: Performed by: STUDENT IN AN ORGANIZED HEALTH CARE EDUCATION/TRAINING PROGRAM

## 2019-07-11 PROCEDURE — 64483 NJX AA&/STRD TFRM EPI L/S 1: CPT

## 2019-07-11 RX ORDER — CYCLOBENZAPRINE HCL 10 MG
TABLET ORAL
COMMUNITY
End: 2020-01-25

## 2019-07-11 RX ORDER — LIDOCAINE HYDROCHLORIDE 10 MG/ML
15 INJECTION, SOLUTION EPIDURAL; INFILTRATION; INTRACAUDAL; PERINEURAL ONCE
Status: COMPLETED | OUTPATIENT
Start: 2019-07-11 | End: 2019-07-11

## 2019-07-11 RX ORDER — DEXAMETHASONE SODIUM PHOSPHATE 10 MG/ML
10 INJECTION INTRAMUSCULAR; INTRAVENOUS ONCE
Status: COMPLETED | OUTPATIENT
Start: 2019-07-11 | End: 2019-07-11

## 2019-07-11 RX ADMIN — IOHEXOL 5 ML: 180 INJECTION INTRAVENOUS at 12:50

## 2019-07-11 RX ADMIN — LIDOCAINE HYDROCHLORIDE 15 ML: 10 INJECTION, SOLUTION EPIDURAL; INFILTRATION; INTRACAUDAL; PERINEURAL at 12:50

## 2019-07-11 RX ADMIN — DEXAMETHASONE SODIUM PHOSPHATE 10 MG: 10 INJECTION INTRAMUSCULAR; INTRAVENOUS at 12:49

## 2019-07-11 RX ADMIN — DEXAMETHASONE SODIUM PHOSPHATE 5 MG: 10 INJECTION INTRAMUSCULAR; INTRAVENOUS at 12:49

## 2019-07-11 NOTE — H&P
Radiology History and Physical    Patient: Santiago Simms 64 y.o. male       Chief Complaint: No chief complaint on file. History of Present Illness: Back pain    History:    No past medical history on file. No family history on file. Social History     Socioeconomic History    Marital status: SINGLE     Spouse name: Not on file    Number of children: Not on file    Years of education: Not on file    Highest education level: Not on file   Occupational History    Not on file   Social Needs    Financial resource strain: Not on file    Food insecurity:     Worry: Not on file     Inability: Not on file    Transportation needs:     Medical: Not on file     Non-medical: Not on file   Tobacco Use    Smoking status: Never Smoker    Smokeless tobacco: Never Used   Substance and Sexual Activity    Alcohol use:  Yes    Drug use: No    Sexual activity: Yes     Partners: Female     Birth control/protection: Condom   Lifestyle    Physical activity:     Days per week: Not on file     Minutes per session: Not on file    Stress: Not on file   Relationships    Social connections:     Talks on phone: Not on file     Gets together: Not on file     Attends Denominational service: Not on file     Active member of club or organization: Not on file     Attends meetings of clubs or organizations: Not on file     Relationship status: Not on file    Intimate partner violence:     Fear of current or ex partner: Not on file     Emotionally abused: Not on file     Physically abused: Not on file     Forced sexual activity: Not on file   Other Topics Concern    Not on file   Social History Narrative    Not on file       Allergies: No Known Allergies    Current Medications:  Current Facility-Administered Medications   Medication Dose Route Frequency    dexamethasone (PF) (DECADRON) injection 10 mg  10 mg IntraVENous ONCE    lidocaine (PF) (XYLOCAINE) 10 mg/mL (1 %) injection 15 mL  15 mL SubCUTAneous ONCE    iohexol (OMNIPAQUE) 180 mg iodine/mL injection 10 mL  10 mL Intrathecal ONCE    dexamethasone (PF) (DECADRON) injection 10 mg  10 mg IntraVENous ONCE        Physical Exam:  Blood pressure 106/60, pulse 72, temperature 98.2 °F (36.8 °C), resp. rate 18, height 5' 8\" (1.727 m), weight 75.8 kg (167 lb), SpO2 99 %. GENERAL: alert, cooperative, no distress, appears stated age  LUNG: clear to auscultation bilaterally  HEART: regular rate and rhythm  ABD: Non tender, non distended. Alerts:      Laboratory:    No results for input(s): HGB, HCT, WBC, PLT, INR, BUN, CREA, K, CRCLT, HGBEXT, HCTEXT, PLTEXT in the last 72 hours. No lab exists for component: PTT, PT, INREXT      Plan of Care/Planned Procedure:  Risks, benefits, and alternatives reviewed with patient and he agrees to proceed with the procedure.        Attila Edwards MD

## 2019-08-20 ENCOUNTER — HOSPITAL ENCOUNTER (OUTPATIENT)
Dept: INTERVENTIONAL RADIOLOGY/VASCULAR | Age: 59
Discharge: HOME | End: 2019-08-20
Attending: STUDENT IN AN ORGANIZED HEALTH CARE EDUCATION/TRAINING PROGRAM | Admitting: STUDENT IN AN ORGANIZED HEALTH CARE EDUCATION/TRAINING PROGRAM
Payer: MEDICAID

## 2019-08-20 DIAGNOSIS — M51.26 HERNIATED NUCLEUS PULPOSUS, LUMBAR: ICD-10-CM

## 2019-08-20 DIAGNOSIS — M48.062 LUMBAR STENOSIS WITH NEUROGENIC CLAUDICATION: ICD-10-CM

## 2019-08-20 PROCEDURE — 74011250636 HC RX REV CODE- 250/636: Performed by: STUDENT IN AN ORGANIZED HEALTH CARE EDUCATION/TRAINING PROGRAM

## 2019-08-20 PROCEDURE — 64483 NJX AA&/STRD TFRM EPI L/S 1: CPT

## 2019-08-20 PROCEDURE — 74011636320 HC RX REV CODE- 636/320: Performed by: STUDENT IN AN ORGANIZED HEALTH CARE EDUCATION/TRAINING PROGRAM

## 2019-08-20 RX ORDER — DEXAMETHASONE SODIUM PHOSPHATE 10 MG/ML
10 INJECTION INTRAMUSCULAR; INTRAVENOUS ONCE
Status: COMPLETED | OUTPATIENT
Start: 2019-08-20 | End: 2019-08-20

## 2019-08-20 RX ORDER — DEXAMETHASONE SODIUM PHOSPHATE 10 MG/ML
10 INJECTION INTRAMUSCULAR; INTRAVENOUS ONCE
Status: DISCONTINUED | OUTPATIENT
Start: 2019-08-20 | End: 2019-08-20 | Stop reason: HOSPADM

## 2019-08-20 RX ORDER — LIDOCAINE HYDROCHLORIDE 10 MG/ML
10 INJECTION, SOLUTION EPIDURAL; INFILTRATION; INTRACAUDAL; PERINEURAL ONCE
Status: COMPLETED | OUTPATIENT
Start: 2019-08-20 | End: 2019-08-20

## 2019-08-20 RX ADMIN — IOHEXOL 5 ML: 180 INJECTION INTRAVENOUS at 12:50

## 2019-08-20 RX ADMIN — LIDOCAINE HYDROCHLORIDE 15 ML: 10 INJECTION, SOLUTION EPIDURAL; INFILTRATION; INTRACAUDAL; PERINEURAL at 12:50

## 2019-08-20 RX ADMIN — DEXAMETHASONE SODIUM PHOSPHATE 10 MG: 10 INJECTION INTRAMUSCULAR; INTRAVENOUS at 12:50

## 2019-08-20 NOTE — H&P
Radiology History and Physical    Patient: Barney Ruiz 64 y.o. male       Chief Complaint: No chief complaint on file. History of Present Illness: Back pain    History:    No past medical history on file. No family history on file. Social History     Socioeconomic History    Marital status: SINGLE     Spouse name: Not on file    Number of children: Not on file    Years of education: Not on file    Highest education level: Not on file   Occupational History    Not on file   Social Needs    Financial resource strain: Not on file    Food insecurity:     Worry: Not on file     Inability: Not on file    Transportation needs:     Medical: Not on file     Non-medical: Not on file   Tobacco Use    Smoking status: Never Smoker    Smokeless tobacco: Never Used   Substance and Sexual Activity    Alcohol use: Yes    Drug use: No    Sexual activity: Yes     Partners: Female     Birth control/protection: Condom   Lifestyle    Physical activity:     Days per week: Not on file     Minutes per session: Not on file    Stress: Not on file   Relationships    Social connections:     Talks on phone: Not on file     Gets together: Not on file     Attends [employer] service: Not on file     Active member of club or organization: Not on file     Attends meetings of clubs or organizations: Not on file     Relationship status: Not on file    Intimate partner violence:     Fear of current or ex partner: Not on file     Emotionally abused: Not on file     Physically abused: Not on file     Forced sexual activity: Not on file   Other Topics Concern    Not on file   Social History Narrative    Not on file       Allergies: No Known Allergies    Current Medications:  No current facility-administered medications for this encounter. Physical Exam:  There were no vitals taken for this visit.   GENERAL: alert, cooperative, no distress, appears stated age  LUNG: clear to auscultation bilaterally  HEART: regular rate and rhythm  ABD: Non tender, non distended. Alerts:      Laboratory:    No results for input(s): HGB, HCT, WBC, PLT, INR, BUN, CREA, K, CRCLT, HGBEXT, HCTEXT, PLTEXT in the last 72 hours. No lab exists for component: PTT, PT, INREXT      Plan of Care/Planned Procedure:  Risks, benefits, and alternatives reviewed with patient and he agrees to proceed with the procedure.        Anali Bliss MD

## 2019-08-20 NOTE — DISCHARGE INSTRUCTIONS
[address] [facility]  Department of Interventional Radiology  [facility]  [phone]  Steroid Injection Discharge Instructions    General Information:   A steroid injection was performed today, placing a combination of a steroid and an anesthetic (numbing medicine) into the space around the nerves of your spine. This is done to treat back pain. It may take 7-10 days for the injection to reach its full potential.  This procedure can be done at any level of the spinal column, depending on where your pain is. Your doctor will have ordered the appropriate level to be treated prior to your coming in for the procedure. Home Care Instructions: You can resume your regular diet. Do not drink alcohol. You may notice that you have to use your pain medications less after your injection. Some people do not notice much of a change in their pain after the first injection. If that is the case, it is worth your time to have a second one done. This is why these injections are sometimes ordered in a series of three. Keep the puncture site clean and dry for 24 hours, and then you may remove the dressing. Showering is acceptable after the bandage is removed. Call If:   You should call your Physician and/or the Radiology Nurse if you have any bleeding other than a small spot on your bandage. Call if you have any signs of infection, fever, increased pain at the puncture site, confusion, or a headache that worsens when you stand and eases when lying flat. Follow-Up Instructions:  Please see your ordering doctor as he/she has requested. Let your doctor know if you have relief from your pain so they may schedule another injection for you if it is indicated. To Reach Us:    Side effects of sedation medications and other medications used today have been reviewed.   Notify us of nausea, itching, hives, dizziness, or anything else out of the ordinary. Should you experience any of these significant changes, please call [phone] between the hours of 7:30 am and 10 pm or [phone] after hours.  After hours, ask the  to page the [address] Technologist, and describe the problem to the technologist.           Patient Signature:  Date: 8/20/2019  Discharging Nurse: Howard Prince RN

## 2019-10-29 ENCOUNTER — OFFICE VISIT (OUTPATIENT)
Dept: INTERNAL MEDICINE CLINIC | Age: 59
End: 2019-10-29

## 2019-10-29 VITALS
HEIGHT: 68 IN | BODY MASS INDEX: 25.67 KG/M2 | HEART RATE: 86 BPM | WEIGHT: 169.4 LBS | SYSTOLIC BLOOD PRESSURE: 134 MMHG | TEMPERATURE: 97.6 F | RESPIRATION RATE: 18 BRPM | DIASTOLIC BLOOD PRESSURE: 70 MMHG | OXYGEN SATURATION: 99 %

## 2019-10-29 DIAGNOSIS — M54.16 LUMBAR RADICULOPATHY, RIGHT: ICD-10-CM
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
## 2019-10-29 DIAGNOSIS — G89.4 CHRONIC PAIN SYNDROME: Primary | ICD-10-CM

## 2019-10-29 RX ORDER — IBUPROFEN 800 MG/1
TABLET ORAL
COMMUNITY
End: 2020-01-25

## 2019-10-29 RX ORDER — TRAMADOL HYDROCHLORIDE 50 MG/1
50 TABLET ORAL
COMMUNITY
End: 2020-01-25

## 2019-10-29 RX ORDER — TAMSULOSIN HYDROCHLORIDE 0.4 MG/1
0.4 CAPSULE ORAL DAILY
COMMUNITY
End: 2020-01-25

## 2019-10-29 RX ORDER — FINASTERIDE 5 MG/1
5 TABLET, FILM COATED ORAL DAILY
COMMUNITY
End: 2020-01-25

## 2019-10-29 NOTE — PROGRESS NOTES
1. Have you been to the ER, urgent care clinic since your last visit? Hospitalized since your last visit? No    2. Have you seen or consulted any other health care providers outside of the [address] [place] [address] since your last visit? Include any pap smears or colon screening.  No

## 2019-10-30 LAB
ALBUMIN SERPL-MCNC: 4.3 G/DL (ref 3.5–5.5)
ALBUMIN/GLOB SERPL: 1.9 {RATIO} (ref 1.2–2.2)
ALP SERPL-CCNC: 72 IU/L (ref 39–117)
ALT SERPL-CCNC: 18 IU/L (ref 0–44)
APPEARANCE UR: CLEAR
AST SERPL-CCNC: 20 IU/L (ref 0–40)
BASOPHILS # BLD AUTO: 0 X10E3/UL (ref 0–0.2)
BASOPHILS NFR BLD AUTO: 1 %
BILIRUB SERPL-MCNC: 0.7 MG/DL (ref 0–1.2)
BILIRUB UR QL STRIP: NEGATIVE
BUN SERPL-MCNC: 10 MG/DL (ref 6–24)
BUN/CREAT SERPL: 10 (ref 9–20)
CALCIUM SERPL-MCNC: 9.2 MG/DL (ref 8.7–10.2)
CHLORIDE SERPL-SCNC: 106 MMOL/L (ref 96–106)
CHOLEST SERPL-MCNC: 150 MG/DL (ref 100–199)
CO2 SERPL-SCNC: 23 MMOL/L (ref 20–29)
COLOR UR: YELLOW
CREAT SERPL-MCNC: 0.99 MG/DL (ref 0.76–1.27)
EOSINOPHIL # BLD AUTO: 0.1 X10E3/UL (ref 0–0.4)
EOSINOPHIL NFR BLD AUTO: 2 %
ERYTHROCYTE [DISTWIDTH] IN BLOOD BY AUTOMATED COUNT: 11.9 % (ref 12.3–15.4)
EST. AVERAGE GLUCOSE BLD GHB EST-MCNC: 111 MG/DL
GLOBULIN SER CALC-MCNC: 2.3 G/DL (ref 1.5–4.5)
GLUCOSE SERPL-MCNC: 90 MG/DL (ref 65–99)
GLUCOSE UR QL: NEGATIVE
HBA1C MFR BLD: 5.5 % (ref 4.8–5.6)
HCT VFR BLD AUTO: 41.4 % (ref 37.5–51)
HDLC SERPL-MCNC: 44 MG/DL
HGB BLD-MCNC: 14.2 G/DL (ref 13–17.7)
HGB UR QL STRIP: NEGATIVE
IMM GRANULOCYTES # BLD AUTO: 0 X10E3/UL (ref 0–0.1)
IMM GRANULOCYTES NFR BLD AUTO: 0 %
KETONES UR QL STRIP: NEGATIVE
LDLC SERPL CALC-MCNC: 93 MG/DL (ref 0–99)
LEUKOCYTE ESTERASE UR QL STRIP: NEGATIVE
LYMPHOCYTES # BLD AUTO: 1.6 X10E3/UL (ref 0.7–3.1)
LYMPHOCYTES NFR BLD AUTO: 34 %
MCH RBC QN AUTO: 32.5 PG (ref 26.6–33)
MCHC RBC AUTO-ENTMCNC: 34.3 G/DL (ref 31.5–35.7)
MCV RBC AUTO: 95 FL (ref 79–97)
MICRO URNS: NORMAL
MONOCYTES # BLD AUTO: 0.3 X10E3/UL (ref 0.1–0.9)
MONOCYTES NFR BLD AUTO: 7 %
NEUTROPHILS # BLD AUTO: 2.6 X10E3/UL (ref 1.4–7)
NEUTROPHILS NFR BLD AUTO: 56 %
NITRITE UR QL STRIP: NEGATIVE
PH UR STRIP: 5.5 [PH] (ref 5–7.5)
PLATELET # BLD AUTO: 222 X10E3/UL (ref 150–450)
POTASSIUM SERPL-SCNC: 4.1 MMOL/L (ref 3.5–5.2)
PROT SERPL-MCNC: 6.6 G/DL (ref 6–8.5)
PROT UR QL STRIP: NEGATIVE
PSA SERPL-MCNC: 1.3 NG/ML (ref 0–4)
RBC # BLD AUTO: 4.37 X10E6/UL (ref 4.14–5.8)
SODIUM SERPL-SCNC: 142 MMOL/L (ref 134–144)
SP GR UR: 1.02 (ref 1–1.03)
TRIGL SERPL-MCNC: 64 MG/DL (ref 0–149)
UROBILINOGEN UR STRIP-MCNC: 0.2 MG/DL (ref 0.2–1)
VLDLC SERPL CALC-MCNC: 13 MG/DL (ref 5–40)
WBC # BLD AUTO: 4.7 X10E3/UL (ref 3.4–10.8)

## 2019-11-19 ENCOUNTER — HOSPITAL ENCOUNTER (OUTPATIENT)
Dept: INTERVENTIONAL RADIOLOGY/VASCULAR | Age: 59
Discharge: HOME | End: 2019-11-19
Attending: STUDENT IN AN ORGANIZED HEALTH CARE EDUCATION/TRAINING PROGRAM | Admitting: STUDENT IN AN ORGANIZED HEALTH CARE EDUCATION/TRAINING PROGRAM
Payer: MEDICAID

## 2019-11-19 DIAGNOSIS — M51.26 HNP (HERNIATED NUCLEUS PULPOSUS), LUMBAR: ICD-10-CM

## 2019-11-19 DIAGNOSIS — M48.062 LUMBAR STENOSIS WITH NEUROGENIC CLAUDICATION: ICD-10-CM

## 2019-11-19 DIAGNOSIS — M54.50 LOW BACK PAIN WITH RADIATION: ICD-10-CM

## 2019-11-19 PROCEDURE — 74011636320 HC RX REV CODE- 636/320: Performed by: STUDENT IN AN ORGANIZED HEALTH CARE EDUCATION/TRAINING PROGRAM

## 2019-11-19 PROCEDURE — 64483 NJX AA&/STRD TFRM EPI L/S 1: CPT

## 2019-11-19 PROCEDURE — 74011000250 HC RX REV CODE- 250: Performed by: STUDENT IN AN ORGANIZED HEALTH CARE EDUCATION/TRAINING PROGRAM

## 2019-11-19 PROCEDURE — 74011250636 HC RX REV CODE- 250/636: Performed by: STUDENT IN AN ORGANIZED HEALTH CARE EDUCATION/TRAINING PROGRAM

## 2019-11-19 RX ORDER — DEXAMETHASONE SODIUM PHOSPHATE 10 MG/ML
20 INJECTION INTRAMUSCULAR; INTRAVENOUS ONCE
Status: COMPLETED | OUTPATIENT
Start: 2019-11-19 | End: 2019-11-19

## 2019-11-19 RX ORDER — LIDOCAINE HYDROCHLORIDE 10 MG/ML
15 INJECTION, SOLUTION EPIDURAL; INFILTRATION; INTRACAUDAL; PERINEURAL ONCE
Status: COMPLETED | OUTPATIENT
Start: 2019-11-19 | End: 2019-11-19

## 2019-11-19 RX ADMIN — IOHEXOL 3 ML: 180 INJECTION INTRAVENOUS at 10:48

## 2019-11-19 RX ADMIN — DEXAMETHASONE SODIUM PHOSPHATE 20 MG: 10 INJECTION INTRAMUSCULAR; INTRAVENOUS at 10:48

## 2019-11-19 RX ADMIN — LIDOCAINE HYDROCHLORIDE 10 ML: 10 INJECTION, SOLUTION EPIDURAL; INFILTRATION; INTRACAUDAL; PERINEURAL at 10:48

## 2019-11-19 NOTE — ROUTINE PROCESS
Pt. D/c'ed to home and transported to d/c lot via w/c. Able to stand and step without difficulty. Po fluids encouraged and provided.
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
## 2019-12-13 ENCOUNTER — HOSPITAL ENCOUNTER (EMERGENCY)
Age: 59
Discharge: HOME | End: 2019-12-13
Attending: EMERGENCY MEDICINE
Payer: MEDICAID

## 2019-12-13 ENCOUNTER — APPOINTMENT (OUTPATIENT)
Dept: GENERAL RADIOLOGY | Age: 59
End: 2019-12-13
Attending: PHYSICIAN ASSISTANT
Payer: MEDICAID

## 2019-12-13 VITALS
WEIGHT: 172 LBS | TEMPERATURE: 99.7 F | BODY MASS INDEX: 26.07 KG/M2 | OXYGEN SATURATION: 97 % | DIASTOLIC BLOOD PRESSURE: 82 MMHG | RESPIRATION RATE: 18 BRPM | HEART RATE: 77 BPM | SYSTOLIC BLOOD PRESSURE: 121 MMHG | HEIGHT: 68 IN

## 2019-12-13 DIAGNOSIS — J06.9 VIRAL UPPER RESPIRATORY TRACT INFECTION: Primary | ICD-10-CM

## 2019-12-13 LAB
FLUAV AG NPH QL IA: NEGATIVE
FLUBV AG NOSE QL IA: NEGATIVE

## 2019-12-13 PROCEDURE — 99282 EMERGENCY DEPT VISIT SF MDM: CPT

## 2019-12-13 PROCEDURE — 87804 INFLUENZA ASSAY W/OPTIC: CPT

## 2019-12-13 PROCEDURE — 71046 X-RAY EXAM CHEST 2 VIEWS: CPT

## 2019-12-13 RX ORDER — GUAIFENESIN 100 MG/5ML
200 SOLUTION ORAL
Qty: 118 ML | Refills: 0 | Status: SHIPPED | OUTPATIENT
Start: 2019-12-13 | End: 2020-01-25

## 2019-12-13 RX ORDER — ACETAMINOPHEN 500 MG
1000 TABLET ORAL
Qty: 20 TAB | Refills: 0 | Status: SHIPPED | OUTPATIENT
Start: 2019-12-13

## 2019-12-13 NOTE — ED NOTES
Pt C/O cold sx for two days OTC med's not effective. Pt is A+Ox3 clear speaking. Emergency Department Nursing Plan of Care       The Nursing Plan of Care is developed from the Nursing assessment and Emergency Department Attending provider initial evaluation. The plan of care may be reviewed in the ED Provider note.     The Plan of Care was developed with the following considerations:   Patient / Family readiness to learn indicated by:verbalized understanding  Persons(s) to be included in education: patient  Barriers to Learning/Limitations:No    Signed     Elisa Tyson RN    12/13/2019   6:59 PM

## 2019-12-14 NOTE — ED NOTES
Discharge instructions were given to the patient by Guero Bhat RN. The patient left the Emergency Department ambulatory, alert and oriented and in no acute distress with 3 prescriptions. The patient was encouraged to call or return to the ED for worsening issues or problems and was encouraged to schedule a follow up appointment for continuing care. The patient verbalized understanding of discharge instructions and prescriptions, all questions were answered. The patient has no further concerns at this time.

## 2019-12-14 NOTE — DISCHARGE INSTRUCTIONS
Patient Education        Upper Respiratory Infection (Cold): Care Instructions  Your Care Instructions    An upper respiratory infection, or URI, is an infection of the nose, sinuses, or throat. URIs are spread by coughs, sneezes, and direct contact. The common cold is the most frequent kind of URI. The flu and sinus infections are other kinds of URIs. Almost all URIs are caused by viruses. Antibiotics won't cure them. But you can treat most infections with home care. This may include drinking lots of fluids and taking over-the-counter pain medicine. You will probably feel better in 4 to 10 days. The doctor has checked you carefully, but problems can develop later. If you notice any problems or new symptoms, get medical treatment right away. Follow-up care is a key part of your treatment and safety. Be sure to make and go to all appointments, and call your doctor if you are having problems. It's also a good idea to know your test results and keep a list of the medicines you take. How can you care for yourself at home? · To prevent dehydration, drink plenty of fluids, enough so that your urine is light yellow or clear like water. Choose water and other caffeine-free clear liquids until you feel better. If you have kidney, heart, or liver disease and have to limit fluids, talk with your doctor before you increase the amount of fluids you drink. · Take an over-the-counter pain medicine, such as acetaminophen (Tylenol), ibuprofen (Advil, Motrin), or naproxen (Aleve). Read and follow all instructions on the label. · Before you use cough and cold medicines, check the label. These medicines may not be safe for young children or for people with certain health problems. · Be careful when taking over-the-counter cold or flu medicines and Tylenol at the same time. Many of these medicines have acetaminophen, which is Tylenol. Read the labels to make sure that you are not taking more than the recommended dose.  Too much acetaminophen (Tylenol) can be harmful. · Get plenty of rest.  · Do not smoke or allow others to smoke around you. If you need help quitting, talk to your doctor about stop-smoking programs and medicines. These can increase your chances of quitting for good. When should you call for help? Call 911 anytime you think you may need emergency care. For example, call if:    · You have severe trouble breathing.    Call your doctor now or seek immediate medical care if:    · You seem to be getting much sicker.     · You have new or worse trouble breathing.     · You have a new or higher fever.     · You have a new rash.    Watch closely for changes in your health, and be sure to contact your doctor if:    · You have a new symptom, such as a sore throat, an earache, or sinus pain.     · You cough more deeply or more often, especially if you notice more mucus or a change in the color of your mucus.     · You do not get better as expected. Where can you learn more? Go to http://portillo-terry.info/. Enter Y918 in the search box to learn more about \"Upper Respiratory Infection (Cold): Care Instructions. \"  Current as of: June 9, 2019  Content Version: 12.2  © 6034-4829 [employer]. Care instructions adapted under license by [employer] (which disclaims liability or warranty for this information). If you have questions about a medical condition or this instruction, always ask your healthcare professional. [address] any warranty or liability for your use of this information.
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
## 2020-01-25 ENCOUNTER — HOSPITAL ENCOUNTER (EMERGENCY)
Age: 60
Discharge: HOME | End: 2020-01-25
Attending: EMERGENCY MEDICINE
Payer: MEDICAID

## 2020-01-25 ENCOUNTER — APPOINTMENT (OUTPATIENT)
Dept: GENERAL RADIOLOGY | Age: 60
End: 2020-01-25
Attending: NURSE PRACTITIONER
Payer: MEDICAID

## 2020-01-25 VITALS
OXYGEN SATURATION: 99 % | WEIGHT: 170 LBS | BODY MASS INDEX: 25.76 KG/M2 | HEIGHT: 68 IN | DIASTOLIC BLOOD PRESSURE: 80 MMHG | RESPIRATION RATE: 17 BRPM | SYSTOLIC BLOOD PRESSURE: 131 MMHG | HEART RATE: 76 BPM | TEMPERATURE: 97.6 F

## 2020-01-25 DIAGNOSIS — S83.91XA SPRAIN OF RIGHT KNEE, UNSPECIFIED LIGAMENT, INITIAL ENCOUNTER: ICD-10-CM
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
## 2020-01-25 DIAGNOSIS — M47.812 OSTEOARTHRITIS OF CERVICAL SPINE, UNSPECIFIED SPINAL OSTEOARTHRITIS COMPLICATION STATUS: Primary | ICD-10-CM

## 2020-01-25 DIAGNOSIS — S63.91XA SPRAIN OF RIGHT HAND, INITIAL ENCOUNTER: ICD-10-CM

## 2020-01-25 DIAGNOSIS — M47.816 OSTEOARTHRITIS OF LUMBAR SPINE, UNSPECIFIED SPINAL OSTEOARTHRITIS COMPLICATION STATUS: ICD-10-CM

## 2020-01-25 PROCEDURE — 99283 EMERGENCY DEPT VISIT LOW MDM: CPT

## 2020-01-25 PROCEDURE — 73562 X-RAY EXAM OF KNEE 3: CPT

## 2020-01-25 PROCEDURE — 74011250636 HC RX REV CODE- 250/636: Performed by: NURSE PRACTITIONER

## 2020-01-25 PROCEDURE — 74011250637 HC RX REV CODE- 250/637: Performed by: NURSE PRACTITIONER

## 2020-01-25 PROCEDURE — 73130 X-RAY EXAM OF HAND: CPT

## 2020-01-25 PROCEDURE — 72100 X-RAY EXAM L-S SPINE 2/3 VWS: CPT

## 2020-01-25 PROCEDURE — 96372 THER/PROPH/DIAG INJ SC/IM: CPT

## 2020-01-25 PROCEDURE — 72050 X-RAY EXAM NECK SPINE 4/5VWS: CPT

## 2020-01-25 RX ORDER — METHOCARBAMOL 500 MG/1
500 TABLET, FILM COATED ORAL 4 TIMES DAILY
Status: DISCONTINUED | OUTPATIENT
Start: 2020-01-25 | End: 2020-01-25 | Stop reason: SDUPTHER

## 2020-01-25 RX ORDER — DICLOFENAC SODIUM 75 MG/1
75 TABLET, DELAYED RELEASE ORAL 2 TIMES DAILY
Qty: 30 TAB | Refills: 0 | Status: SHIPPED | OUTPATIENT
Start: 2020-01-25 | End: 2020-01-25

## 2020-01-25 RX ORDER — METHOCARBAMOL 500 MG/1
500 TABLET, FILM COATED ORAL ONCE
Status: COMPLETED | OUTPATIENT
Start: 2020-01-25 | End: 2020-01-25

## 2020-01-25 RX ORDER — KETOROLAC TROMETHAMINE 30 MG/ML
30 INJECTION, SOLUTION INTRAMUSCULAR; INTRAVENOUS
Status: COMPLETED | OUTPATIENT
Start: 2020-01-25 | End: 2020-01-25

## 2020-01-25 RX ORDER — METHOCARBAMOL 500 MG/1
500 TABLET, FILM COATED ORAL 4 TIMES DAILY
Qty: 30 TAB | Refills: 0 | Status: SHIPPED | OUTPATIENT
Start: 2020-01-25 | End: 2020-01-30 | Stop reason: ALTCHOICE

## 2020-01-25 RX ORDER — DICLOFENAC SODIUM 75 MG/1
75 TABLET, DELAYED RELEASE ORAL 2 TIMES DAILY
Qty: 30 TAB | Refills: 0 | Status: SHIPPED | OUTPATIENT
Start: 2020-01-25 | End: 2020-01-30

## 2020-01-25 RX ORDER — METHOCARBAMOL 500 MG/1
500 TABLET, FILM COATED ORAL 4 TIMES DAILY
Qty: 30 TAB | Refills: 0 | Status: SHIPPED | OUTPATIENT
Start: 2020-01-25 | End: 2020-01-25

## 2020-01-25 RX ADMIN — METHOCARBAMOL TABLETS 500 MG: 500 TABLET, COATED ORAL at 18:22

## 2020-01-25 RX ADMIN — KETOROLAC TROMETHAMINE 30 MG: 30 INJECTION, SOLUTION INTRAMUSCULAR; INTRAVENOUS at 18:22
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
## 2020-01-25 NOTE — ED NOTES
Emergency Department Nursing Plan of Care       The Nursing Plan of Care is developed from the Nursing assessment and Emergency Department Attending provider initial evaluation. The plan of care may be reviewed in the ED Provider note. The Plan of Care was developed with the following considerations:   Patient / Family readiness to learn indicated by:verbalized understanding  Persons(s) to be included in education: patient  Barriers to Learning/Limitations:No    Signed     Lucien Pallas    1/25/2020   5:54 PM    Patient is alert and oriented x 4 and in no acute distress at this time. Respirations are at a regular rate, depth, and pattern. Patient updated on plan of care and has no questions or concerns at this time. Call bell within reach. Will continue to monitor. Please reference nursing assessment.

## 2020-01-26 NOTE — DISCHARGE INSTRUCTIONS
Patient Education        Hand Sprain: Care Instructions  Your Care Instructions  A hand sprain occurs when you stretch or tear a ligament in your hand. Ligaments are the tough tissues that connect one bone to another. Most hand sprains will heal with treatment you can do at home. Follow-up care is a key part of your treatment and safety. Be sure to make and go to all appointments, and call your doctor if you are having problems. It's also a good idea to know your test results and keep a list of the medicines you take. How can you care for yourself at home? · If your doctor gave you a splint or immobilizer, wear it as directed. This will help keep swelling down and help your hand heal.  · Follow your doctor's directions for exercise and other activity. · For the first 2 days after your injury, avoid things that might increase swelling, such as hot showers, hot tubs, or hot packs. · Put ice or a cold pack on your hand for 10 to 20 minutes at a time to stop swelling. Try this every 1 to 2 hours for 3 days (when you are awake) or until the swelling goes down. Put a thin cloth between the ice pack and your skin. Keep your splint dry. · After 2 or 3 days, if your swelling is gone, put a heating pad (set on low) or a warm cloth on your hand. Some experts suggest that you go back and forth between hot and cold treatments. · Prop up your hand on a pillow when you ice it or anytime you sit or lie down. Try to keep it above the level of your heart. This will help reduce swelling. · Take pain medicines exactly as directed. ? If the doctor gave you a prescription medicine for pain, take it as prescribed. ? If you are not taking a prescription pain medicine, ask your doctor if you can take an over-the-counter medicine. · Return to your usual level of activity slowly. When should you call for help?   Call your doctor now or seek immediate medical care if:    · Your pain is worse.     · You have new or increased swelling in your hand.     · You cannot move your hand.     · You have tingling, weakness, or numbness in your hand or fingers.     · Your hand or fingers are cool or pale or change color.     · You have a fever.     · Your hand or fingers are red.    Watch closely for changes in your health, and be sure to contact your doctor if:    · Your hand does not get better as expected. Where can you learn more? Go to http://portillo-terry.info/. Enter X004 in the search box to learn more about \"Hand Sprain: Care Instructions. \"  Current as of: June 26, 2019  Content Version: 12.2  © 8106-1177 "[employer]. Care instructions adapted under license by [employer] (which disclaims liability or warranty for this information). If you have questions about a medical condition or this instruction, always ask your healthcare professional. [place] [address] any warranty or liability for your use of this information. Patient Education        Cervical Spondylosis: Care Instructions  Your Care Instructions    Cervical spondylosis is a type of arthritis of the neck. It can happen as people get older. It may be caused by bone spurs or other problems. You may have neck pain and stiffness. Sometimes the space around the spinal cord narrows. When this happens, it is called spinal stenosis. Spinal stenosis can cause pain, numbness, or weakness in the arms, legs, feet, and rear end (buttocks). It can also cause loss of bowel and bladder control. You can treat some of your symptoms with over-the-counter pain medicine. But if you have spinal stenosis with severe symptoms, you may need surgery. Follow-up care is a key part of your treatment and safety. Be sure to make and go to all appointments, and call your doctor if you are having problems. It's also a good idea to know your test results and keep a list of the medicines you take.   How can you care for yourself at home?  · Take anti-inflammatory medicines to reduce neck pain. These include ibuprofen (Advil, Motrin) and naproxen (Aleve). Be safe with medicines. Read and follow all instructions on the label. · Follow your doctor's recommendation about activity. He or she may tell you not to do sports or activities that could injure your neck. When should you call for help? Call 911 anytime you think you may need emergency care. For example, call if:    · You are unable to move an arm or a leg at all.   aLtrelldaksha Rucks your doctor now or seek immediate medical care if:    · You have new or worse symptoms in your arms, legs, belly, or buttocks. Symptoms may include:  ? Numbness or tingling. ? Weakness. ? Pain.     · You lose bladder or bowel control.    Watch closely for changes in your health, and be sure to contact your doctor if:    · You do not get better as expected. Where can you learn more? Go to http://portillo-terry.info/. Enter H744 in the search box to learn more about \"Cervical Spondylosis: Care Instructions. \"  Current as of: June 26, 2019  Content Version: 12.2  © 7578-4814 [employer]. Care instructions adapted under license by [employer] (which disclaims liability or warranty for this information). If you have questions about a medical condition or this instruction, always ask your healthcare professional. [place] [address] any warranty or liability for your use of this information. Patient Education        Arthritis: Care Instructions  Your Care Instructions  Arthritis, also called osteoarthritis, is a breakdown of the cartilage that cushions your joints. When the cartilage wears down, your bones rub against each other. This causes pain and stiffness. Many people have some arthritis as they age. Arthritis most often affects the joints of the spine, hands, hips, knees, or feet.   You can take simple measures to protect your joints, ease your pain, and help you stay active. Follow-up care is a key part of your treatment and safety. Be sure to make and go to all appointments, and call your doctor if you are having problems. It's also a good idea to know your test results and keep a list of the medicines you take. How can you care for yourself at home? · Stay at a healthy weight. Being overweight puts extra strain on your joints. · Talk to your doctor or physical therapist about exercises that will help ease joint pain. ? Stretch. You may enjoy gentle forms of yoga to help keep your joints and muscles flexible. ? Walk instead of jog. Other types of exercise that are less stressful on the joints include riding a bicycle, swimming, wilber chi, or water exercise. ? Lift weights. Strong muscles help reduce stress on your joints. Stronger thigh muscles, for example, take some of the stress off of the knees and hips. Learn the right way to lift weights so you do not make joint pain worse. · Take your medicines exactly as prescribed. Call your doctor if you think you are having a problem with your medicine. · Take pain medicines exactly as directed. ? If the doctor gave you a prescription medicine for pain, take it as prescribed. ? If you are not taking a prescription pain medicine, ask your doctor if you can take an over-the-counter medicine. · Use a cane, crutch, walker, or another device if you need help to get around. These can help rest your joints. You also can use other things to make life easier, such as a higher toilet seat and padded handles on kitchen utensils. · Do not sit in low chairs, which can make it hard to get up. · Put heat or cold on your sore joints as needed. Use whichever helps you most. You also can take turns with hot and cold packs. ? Apply heat 2 or 3 times a day for 20 to 30 minutes--using a heating pad, hot shower, or hot pack--to relieve pain and stiffness.   ? Put ice or a cold pack on your sore joint for 10 to 20 minutes at a time. Put a thin cloth between the ice and your skin. When should you call for help? Call your doctor now or seek immediate medical care if:    · You have sudden swelling, warmth, or pain in any joint.     · You have joint pain and a fever or rash.     · You have such bad pain that you cannot use a joint.    Watch closely for changes in your health, and be sure to contact your doctor if:    · You have mild joint symptoms that continue even with more than 6 weeks of care at home.     · You have stomach pain or other problems with your medicine. Where can you learn more? Go to http://portillo-terry.info/. Enter I391 in the search box to learn more about \"Arthritis: Care Instructions. \"  Current as of: April 1, 2019  Content Version: 12.2  © 5272-5767 [employer]. Care instructions adapted under license by [employer] (which disclaims liability or warranty for this information). If you have questions about a medical condition or this instruction, always ask your healthcare professional. [place] [address] any warranty or liability for your use of this information.

## 2020-01-26 NOTE — ED NOTES
Verbal shift change report given to May Grossman RN (oncoming nurse) by Sheila Song RN (offgoing nurse). Report included the following information SBAR, Kardex, ED Summary, Procedure Summary, MAR and Recent Results.

## 2020-01-26 NOTE — ED NOTES
Discharge instructions were given to the patient by Matti Collins NP. The patient left the Emergency Department ambulatory, alert and oriented and in no acute distress with 2 prescriptions. The patient was encouraged to call or return to the ED for worsening issues or problems and was encouraged to schedule a follow up appointment for continuing care. The patient verbalized understanding of discharge instructions and prescriptions, all questions were answered. The patient has no further concerns at this time.
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
## 2020-01-30 ENCOUNTER — OFFICE VISIT (OUTPATIENT)
Dept: INTERNAL MEDICINE CLINIC | Age: 60
End: 2020-01-30

## 2020-01-30 VITALS
OXYGEN SATURATION: 97 % | TEMPERATURE: 96.5 F | SYSTOLIC BLOOD PRESSURE: 128 MMHG | HEART RATE: 62 BPM | BODY MASS INDEX: 26.37 KG/M2 | HEIGHT: 68 IN | DIASTOLIC BLOOD PRESSURE: 88 MMHG | WEIGHT: 174 LBS | RESPIRATION RATE: 18 BRPM

## 2020-01-30 DIAGNOSIS — G89.4 CHRONIC PAIN SYNDROME: ICD-10-CM
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
## 2020-01-30 DIAGNOSIS — M54.16 LUMBAR RADICULOPATHY, RIGHT: ICD-10-CM

## 2020-01-30 RX ORDER — IBUPROFEN 800 MG/1
800 TABLET ORAL
Qty: 90 TAB | Refills: 2 | Status: SHIPPED | OUTPATIENT
Start: 2020-01-30

## 2020-01-30 RX ORDER — CYCLOBENZAPRINE HCL 10 MG
10 TABLET ORAL
Qty: 60 TAB | Refills: 2 | Status: SHIPPED | OUTPATIENT
Start: 2020-01-30
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
## 2020-01-30 NOTE — PROGRESS NOTES
Chief Complaint   Patient presents with   [address] [place] [address] ED Follow-up     1. Have you been to the ER, urgent care clinic since your last visit? Hospitalized since your last visit? Yes When: 1- Where: [facility] [place] Reason for visit: right hand pain     2. Have you seen or consulted any other health care providers outside of the [address] [place] [address] since your last visit? Include any pap smears or colon screening.  No

## 2020-03-27 ENCOUNTER — HOSPITAL ENCOUNTER (OUTPATIENT)
Dept: MRI IMAGING | Age: 60
Discharge: HOME | End: 2020-03-27
Attending: PHYSICAL MEDICINE & REHABILITATION
Payer: MEDICAID

## 2020-03-27 DIAGNOSIS — M47.812 CERVICAL SPONDYLOSIS WITHOUT MYELOPATHY: ICD-10-CM

## 2020-03-27 DIAGNOSIS — R20.1 HYPOESTHESIA: ICD-10-CM

## 2020-03-27 DIAGNOSIS — R29.898 LIMB WEAKNESS: ICD-10-CM

## 2020-03-27 DIAGNOSIS — M54.2 CERVICALGIA: ICD-10-CM

## 2020-03-27 PROCEDURE — 72141 MRI NECK SPINE W/O DYE: CPT

## 2020-03-27 PROCEDURE — 72148 MRI LUMBAR SPINE W/O DYE: CPT

## 2020-04-07 PROBLEM — S62.340D: Status: ACTIVE | Noted: 2020-01-25

## 2020-04-07 PROBLEM — M22.2X1 PATELLOFEMORAL SYNDROME, RIGHT: Status: ACTIVE | Noted: 2020-01-25

## 2020-08-26 PROCEDURE — 74011250636 HC RX REV CODE- 250/636: Performed by: EMERGENCY MEDICINE

## 2020-08-28 ENCOUNTER — HOSPITAL ENCOUNTER (EMERGENCY)
Age: 60
Discharge: HOME | End: 2020-08-28
Attending: EMERGENCY MEDICINE
Payer: MEDICAID

## 2020-08-28 VITALS
OXYGEN SATURATION: 100 % | SYSTOLIC BLOOD PRESSURE: 125 MMHG | RESPIRATION RATE: 18 BRPM | DIASTOLIC BLOOD PRESSURE: 79 MMHG | WEIGHT: 175 LBS | BODY MASS INDEX: 26.52 KG/M2 | HEIGHT: 68 IN | HEART RATE: 72 BPM | TEMPERATURE: 98.3 F

## 2020-08-28 DIAGNOSIS — Z76.89 ENCOUNTER FOR ASSESSMENT OF STD EXPOSURE: Primary | ICD-10-CM

## 2020-08-28 PROCEDURE — 74011250636 HC RX REV CODE- 250/636: Performed by: EMERGENCY MEDICINE

## 2020-08-28 PROCEDURE — 74011250637 HC RX REV CODE- 250/637: Performed by: EMERGENCY MEDICINE

## 2020-08-28 PROCEDURE — 99283 EMERGENCY DEPT VISIT LOW MDM: CPT

## 2020-08-28 PROCEDURE — 96372 THER/PROPH/DIAG INJ SC/IM: CPT

## 2020-08-28 PROCEDURE — 87491 CHLMYD TRACH DNA AMP PROBE: CPT

## 2020-08-28 RX ORDER — METRONIDAZOLE 500 MG/1
2000 TABLET ORAL
Status: COMPLETED | OUTPATIENT
Start: 2020-08-28 | End: 2020-08-28

## 2020-08-28 RX ADMIN — PENICILLIN G BENZATHINE 2.4 MILLION UNITS: 1200000 INJECTION, SUSPENSION INTRAMUSCULAR at 17:05

## 2020-08-28 RX ADMIN — METRONIDAZOLE 2000 MG: 500 TABLET ORAL at 17:05
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
## 2020-08-28 NOTE — ED NOTES
Patient here with c/o exposure to STD. Patient states that a girl friend of his called him up and told him that she was being treated at another hospital.  Patient denies any symptoms. Patient denies fevers. Denies dysuria. Emergency Department Nursing Plan of Care       The Nursing Plan of Care is developed from the Nursing assessment and Emergency Department Attending provider initial evaluation. The plan of care may be reviewed in the ED Provider note.     The Plan of Care was developed with the following considerations:   Patient / Family readiness to learn indicated by:verbalized understanding  Persons(s) to be included in education: patient  Barriers to Learning/Limitations:No    Signed     Yony Mena RN    8/28/2020   6:30 PM

## 2021-05-03 ENCOUNTER — HOSPITAL ENCOUNTER (OUTPATIENT)
Dept: PREADMISSION TESTING | Age: 61
Discharge: HOME | End: 2021-05-03
Payer: COMMERCIAL

## 2021-05-03 VITALS
SYSTOLIC BLOOD PRESSURE: 136 MMHG | TEMPERATURE: 97.4 F | HEART RATE: 70 BPM | DIASTOLIC BLOOD PRESSURE: 91 MMHG | RESPIRATION RATE: 18 BRPM | OXYGEN SATURATION: 100 %

## 2021-05-03 PROCEDURE — U0005 INFEC AGEN DETEC AMPLI PROBE: HCPCS

## 2021-05-03 RX ORDER — CHOLECALCIFEROL (VITAMIN D3) 125 MCG
1 CAPSULE ORAL DAILY
COMMUNITY

## 2021-05-03 RX ORDER — LIDOCAINE 50 MG/G
1 PATCH TOPICAL EVERY 24 HOURS
COMMUNITY
End: 2022-11-02 | Stop reason: SDUPTHER

## 2021-05-03 RX ORDER — DULOXETIN HYDROCHLORIDE 30 MG/1
30 CAPSULE, DELAYED RELEASE ORAL DAILY
COMMUNITY

## 2021-05-03 RX ORDER — GABAPENTIN 300 MG/1
300 CAPSULE ORAL
COMMUNITY

## 2021-05-03 RX ORDER — BACLOFEN 10 MG/1
10 TABLET ORAL
COMMUNITY

## 2021-05-03 RX ORDER — NAPROXEN SODIUM 220 MG
220 TABLET ORAL 2 TIMES DAILY WITH MEALS
COMMUNITY

## 2021-05-03 NOTE — PERIOP NOTES
[address] , [address] [facility]   MAIN OR                                  [phone]   MAIN PRE OP                          [phone]                                                                                AMBULATORY PRE OP          [phone]  PRE-ADMISSION TESTING    [phone]   Surgery Date:  Friday, May 7th*       Is surgery arrival time given by surgeon? NO  If NO, [facility] staff will call you between 3 and 7pm the day before your surgery with your arrival time. (If your surgery is on a Monday, we will call you the Friday before.)    Call [phone] after 7pm Monday-Friday if you did not receive this call. INSTRUCTIONS BEFORE YOUR SURGERY   When You  Arrive Arrive at the [facility] on the day of your surgery  Have your insurance card, photo ID, and any copayment (if needed)   Food   and   Drink NO food or drink after midnight the night before surgery    This means NO water, gum, mints, coffee, juice, etc.  No alcohol (beer, wine, liquor) 24 hours before and after surgery   Medications to   TAKE   Morning of Surgery MEDICATIONS TO TAKE THE MORNING OF SURGERY WITH A SIP OF WATER:   Duloxetine  Gabapentin if needed   Medications  To  STOP      7 days before surgery  Non-Steroidal anti-inflammatory Drugs (NSAID's): for example, Ibuprofen (Advil, Motrin), Naproxen (Aleve)   Aspirin, if taking for pain    Herbal supplements, vitamins, and fish oil   Other:  (Pain medications not listed above, including Tylenol may be taken)        Bathing Clothing  Jewelry  Valuables      If you shower the morning of surgery, please do not apply anything to your skin (lotions, powders, deodorant, or makeup, especially mascara)   Follow Chlorhexidine Care Fusion body wash instructions provided to you during PAT appointment. Begin 3 days prior to surgery.    Do not shave or trim anywhere 24 hours before surgery   Wear your hair loose or down; no pony-tails, buns, or metal hair clips   Wear loose, comfortable, clean clothes   Wear glasses instead of contacts   Leave money, valuables, and jewelry, including body piercings, at home   Going Home - or Spending the Night  SAME-DAY SURGERY: You must have a responsible adult drive you home and stay with you 24 hours after surgery   ADMITS: If your doctor is keeping you in the hospital after surgery, leave personal belongings/luggage in your car until you have a hospital room number. Hospital discharge time is 12 noon  Drivers must be here before 12 noon unless you are told differently   Special Instructions COVID19 Precautions     Follow all instructions so your surgery wont be cancelled. Please, be on time. If a situation occurs and you are delayed the day of surgery, call [phone] or [phone]. If your physical condition changes (like a fever, cold, flu, etc.) call your surgeon. Home medication(s) reviewed and verified via     LIST   VERBAL   during PAT appointment. The patient was contacted by     IN-PERSON  The patient verbalizes understanding of all instructions and     DOES NOT   need reinforcement.

## 2021-05-04 LAB — SARS-COV-2, COV2NT: NOT DETECTED

## 2021-05-06 ENCOUNTER — ANESTHESIA EVENT (OUTPATIENT)
Dept: SURGERY | Age: 61
End: 2021-05-06
Payer: COMMERCIAL

## 2021-05-06 NOTE — PERIOP NOTES
Spoke to Pau at Dr. Leti Noonan office requesting that the H&P for surgery be faxed to the Main pre-op.   DOS: 5/7/2021

## 2021-05-07 ENCOUNTER — HOSPITAL ENCOUNTER (OUTPATIENT)
Age: 61
Setting detail: OUTPATIENT SURGERY
Discharge: HOME | End: 2021-05-07
Attending: SURGERY | Admitting: SURGERY
Payer: COMMERCIAL

## 2021-05-07 ENCOUNTER — ANESTHESIA (OUTPATIENT)
Dept: SURGERY | Age: 61
End: 2021-05-07
Payer: COMMERCIAL

## 2021-05-07 VITALS
WEIGHT: 167 LBS | DIASTOLIC BLOOD PRESSURE: 78 MMHG | SYSTOLIC BLOOD PRESSURE: 136 MMHG | RESPIRATION RATE: 18 BRPM | TEMPERATURE: 97.5 F | BODY MASS INDEX: 26.21 KG/M2 | HEIGHT: 67 IN | HEART RATE: 67 BPM | OXYGEN SATURATION: 100 %

## 2021-05-07 PROCEDURE — 77030002933 HC SUT MCRYL J&J -A: Performed by: SURGERY

## 2021-05-07 PROCEDURE — 76060000032 HC ANESTHESIA 0.5 TO 1 HR: Performed by: SURGERY

## 2021-05-07 PROCEDURE — 76210000021 HC REC RM PH II 0.5 TO 1 HR: Performed by: SURGERY

## 2021-05-07 PROCEDURE — 77030018836 HC SOL IRR NACL ICUM -A: Performed by: SURGERY

## 2021-05-07 PROCEDURE — 2709999900 HC NON-CHARGEABLE SUPPLY: Performed by: SURGERY

## 2021-05-07 PROCEDURE — 88304 TISSUE EXAM BY PATHOLOGIST: CPT

## 2021-05-07 PROCEDURE — 74011250636 HC RX REV CODE- 250/636: Performed by: ANESTHESIOLOGY

## 2021-05-07 PROCEDURE — 74011000250 HC RX REV CODE- 250: Performed by: SURGERY

## 2021-05-07 PROCEDURE — 74011000250 HC RX REV CODE- 250: Performed by: ANESTHESIOLOGY

## 2021-05-07 PROCEDURE — 74011250636 HC RX REV CODE- 250/636: Performed by: NURSE ANESTHETIST, CERTIFIED REGISTERED

## 2021-05-07 PROCEDURE — 76010000138 HC OR TIME 0.5 TO 1 HR: Performed by: SURGERY

## 2021-05-07 PROCEDURE — 77030010507 HC ADH SKN DERMBND J&J -B: Performed by: SURGERY

## 2021-05-07 PROCEDURE — 77030031139 HC SUT VCRL2 J&J -A: Performed by: SURGERY

## 2021-05-07 PROCEDURE — 74011250636 HC RX REV CODE- 250/636: Performed by: SURGERY

## 2021-05-07 RX ORDER — LIDOCAINE HYDROCHLORIDE 10 MG/ML
0.1 INJECTION, SOLUTION EPIDURAL; INFILTRATION; INTRACAUDAL; PERINEURAL AS NEEDED
Status: DISCONTINUED | OUTPATIENT
Start: 2021-05-07 | End: 2021-05-07 | Stop reason: HOSPADM

## 2021-05-07 RX ORDER — SODIUM CHLORIDE, SODIUM LACTATE, POTASSIUM CHLORIDE, CALCIUM CHLORIDE 600; 310; 30; 20 MG/100ML; MG/100ML; MG/100ML; MG/100ML
75 INJECTION, SOLUTION INTRAVENOUS CONTINUOUS
Status: DISCONTINUED | OUTPATIENT
Start: 2021-05-07 | End: 2021-05-07 | Stop reason: HOSPADM

## 2021-05-07 RX ORDER — SODIUM CHLORIDE 0.9 % (FLUSH) 0.9 %
5-40 SYRINGE (ML) INJECTION EVERY 8 HOURS
Status: DISCONTINUED | OUTPATIENT
Start: 2021-05-07 | End: 2021-05-07 | Stop reason: HOSPADM

## 2021-05-07 RX ORDER — MIDAZOLAM HYDROCHLORIDE 1 MG/ML
INJECTION, SOLUTION INTRAMUSCULAR; INTRAVENOUS AS NEEDED
Status: DISCONTINUED | OUTPATIENT
Start: 2021-05-07 | End: 2021-05-07 | Stop reason: HOSPADM

## 2021-05-07 RX ORDER — SODIUM CHLORIDE, SODIUM LACTATE, POTASSIUM CHLORIDE, CALCIUM CHLORIDE 600; 310; 30; 20 MG/100ML; MG/100ML; MG/100ML; MG/100ML
100 INJECTION, SOLUTION INTRAVENOUS CONTINUOUS
Status: DISCONTINUED | OUTPATIENT
Start: 2021-05-07 | End: 2021-05-07 | Stop reason: HOSPADM

## 2021-05-07 RX ORDER — SODIUM CHLORIDE, SODIUM LACTATE, POTASSIUM CHLORIDE, CALCIUM CHLORIDE 600; 310; 30; 20 MG/100ML; MG/100ML; MG/100ML; MG/100ML
125 INJECTION, SOLUTION INTRAVENOUS CONTINUOUS
Status: DISCONTINUED | OUTPATIENT
Start: 2021-05-07 | End: 2021-05-07 | Stop reason: HOSPADM

## 2021-05-07 RX ORDER — ONDANSETRON 2 MG/ML
4 INJECTION INTRAMUSCULAR; INTRAVENOUS AS NEEDED
Status: DISCONTINUED | OUTPATIENT
Start: 2021-05-07 | End: 2021-05-07 | Stop reason: HOSPADM

## 2021-05-07 RX ORDER — DIPHENHYDRAMINE HYDROCHLORIDE 50 MG/ML
12.5 INJECTION, SOLUTION INTRAMUSCULAR; INTRAVENOUS AS NEEDED
Status: DISCONTINUED | OUTPATIENT
Start: 2021-05-07 | End: 2021-05-07 | Stop reason: HOSPADM

## 2021-05-07 RX ORDER — PROPOFOL 10 MG/ML
INJECTION, EMULSION INTRAVENOUS
Status: DISCONTINUED | OUTPATIENT
Start: 2021-05-07 | End: 2021-05-07 | Stop reason: HOSPADM

## 2021-05-07 RX ORDER — PROPOFOL 10 MG/ML
INJECTION, EMULSION INTRAVENOUS AS NEEDED
Status: DISCONTINUED | OUTPATIENT
Start: 2021-05-07 | End: 2021-05-07 | Stop reason: HOSPADM

## 2021-05-07 RX ORDER — KETAMINE HYDROCHLORIDE 10 MG/ML
INJECTION, SOLUTION INTRAMUSCULAR; INTRAVENOUS AS NEEDED
Status: DISCONTINUED | OUTPATIENT
Start: 2021-05-07 | End: 2021-05-07 | Stop reason: HOSPADM

## 2021-05-07 RX ORDER — SODIUM CHLORIDE 0.9 % (FLUSH) 0.9 %
5-40 SYRINGE (ML) INJECTION AS NEEDED
Status: DISCONTINUED | OUTPATIENT
Start: 2021-05-07 | End: 2021-05-07 | Stop reason: HOSPADM

## 2021-05-07 RX ORDER — HYDROMORPHONE HYDROCHLORIDE 1 MG/ML
.25-1 INJECTION, SOLUTION INTRAMUSCULAR; INTRAVENOUS; SUBCUTANEOUS
Status: DISCONTINUED | OUTPATIENT
Start: 2021-05-07 | End: 2021-05-07 | Stop reason: HOSPADM

## 2021-05-07 RX ADMIN — SODIUM CHLORIDE, POTASSIUM CHLORIDE, SODIUM LACTATE AND CALCIUM CHLORIDE 75 ML/HR: 600; 310; 30; 20 INJECTION, SOLUTION INTRAVENOUS at 11:11

## 2021-05-07 RX ADMIN — CEFAZOLIN SODIUM 2 G: 1 POWDER, FOR SOLUTION INTRAMUSCULAR; INTRAVENOUS at 13:10

## 2021-05-07 RX ADMIN — MIDAZOLAM HYDROCHLORIDE 1 MG: 2 INJECTION, SOLUTION INTRAMUSCULAR; INTRAVENOUS at 13:03

## 2021-05-07 RX ADMIN — MIDAZOLAM HYDROCHLORIDE 1 MG: 2 INJECTION, SOLUTION INTRAMUSCULAR; INTRAVENOUS at 13:17

## 2021-05-07 RX ADMIN — PROPOFOL 50 MCG/KG/MIN: 10 INJECTION, EMULSION INTRAVENOUS at 13:24

## 2021-05-07 RX ADMIN — KETAMINE HYDROCHLORIDE 30 MG: 10 INJECTION INTRAMUSCULAR; INTRAVENOUS at 13:00

## 2021-05-07 RX ADMIN — SODIUM CHLORIDE, POTASSIUM CHLORIDE, SODIUM LACTATE AND CALCIUM CHLORIDE: 600; 310; 30; 20 INJECTION, SOLUTION INTRAVENOUS at 12:45

## 2021-05-07 RX ADMIN — MIDAZOLAM HYDROCHLORIDE 2 MG: 2 INJECTION, SOLUTION INTRAMUSCULAR; INTRAVENOUS at 13:00

## 2021-05-07 RX ADMIN — PROPOFOL 30 MG: 10 INJECTION, EMULSION INTRAVENOUS at 13:22

## 2021-05-07 RX ADMIN — KETAMINE HYDROCHLORIDE 10 MG: 10 INJECTION INTRAMUSCULAR; INTRAVENOUS at 13:17

## 2021-05-07 RX ADMIN — KETAMINE HYDROCHLORIDE 10 MG: 10 INJECTION INTRAMUSCULAR; INTRAVENOUS at 13:06
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
## 2021-05-07 NOTE — DISCHARGE SUMMARY
Discharge Summary    Patient: Alvin Tan               Sex: male          DOA: 5/7/2021 10:12 AM       YOB: 1960      Age:  63 y.o.        LOS:  LOS: 0 days                Discharge Date:      Admission Diagnoses: UPPER ARM MASS LEFT    Discharge Diagnoses:  Same    Procedure:  Procedure(s):  EXCISION OF LEFT UPPER EXTREMITY MASS (MAC/LOCAL)    Discharge Condition: Good    Hospital Course: Unremarkable operative procedure. Discharge to home in stable condition. Consults: None    Significant Diagnostic Studies: See full electronic record. Discharge Medications:     Current Discharge Medication List      CONTINUE these medications which have NOT CHANGED    Details   gabapentin (NEURONTIN) 300 mg capsule Take 300 mg by mouth three (3) times daily as needed. baclofen (LIORESAL) 10 mg tablet Take 10 mg by mouth three (3) times daily as needed. DULoxetine (CYMBALTA) 30 mg capsule Take 30 mg by mouth daily. cholecalciferol, vitamin D3, (Vitamin D3) 50 mcg (2,000 unit) tab Take 1 Tab by mouth daily. lidocaine (LIDODERM) 5 % 1 Patch by TransDERmal route every twenty-four (24) hours. Apply patch to the affected area for 12 hours a day and remove for 12 hours a day. cyclobenzaprine (FLEXERIL) 10 mg tablet Take 1 Tab by mouth three (3) times daily as needed for Muscle Spasm(s). Qty: 60 Tab, Refills: 2      ibuprofen (MOTRIN) 800 mg tablet Take 1 Tab by mouth every eight (8) hours as needed for Pain. Qty: 90 Tab, Refills: 2      acetaminophen (TYLENOL) 500 mg tablet Take 2 Tabs by mouth every six (6) hours as needed for Pain. Qty: 20 Tab, Refills: 0      naproxen sodium (NAPROSYN) 220 mg tablet Take 220 mg by mouth two (2) times daily (with meals). Activity/Diet/Wound Care: See patient administered discharge instructions.     Follow-up: 2 weeks    Isaías Bashir MD  [facility]  Office:  [phone]  Fax:  [phone]

## 2021-05-07 NOTE — ANESTHESIA POSTPROCEDURE EVALUATION
Procedure(s):  EXCISION OF LEFT UPPER EXTREMITY MASS (MAC/LOCAL). MAC    Anesthesia Post Evaluation        Patient location during evaluation: PACU  Level of consciousness: awake  Pain management: adequate  Airway patency: patent  Anesthetic complications: no  Cardiovascular status: acceptable  Respiratory status: acceptable  Hydration status: acceptable  Post anesthesia nausea and vomiting:  none      INITIAL Post-op Vital signs:   Vitals Value Taken Time   /78 05/07/21 1420   Temp 36.4 °C (97.5 °F) 05/07/21 1407   Pulse 65 05/07/21 1438   Resp 16 05/07/21 1438   SpO2 100 % 05/07/21 1438   Vitals shown include unvalidated device data.

## 2021-05-07 NOTE — ANESTHESIA PREPROCEDURE EVALUATION
Relevant Problems   No relevant active problems       Anesthetic History   No history of anesthetic complications            Review of Systems / Medical History  Patient summary reviewed, nursing notes reviewed and pertinent labs reviewed    Pulmonary  Within defined limits                 Neuro/Psych   Within defined limits           Cardiovascular  Within defined limits                     GI/Hepatic/Renal  Within defined limits              Endo/Other  Within defined limits           Other Findings   Comments: Chronic back pain           Physical Exam    Airway  Mallampati: II  TM Distance: 4 - 6 cm  Neck ROM: normal range of motion   Mouth opening: Normal     Cardiovascular    Rhythm: regular  Rate: normal         Dental    Dentition: Lower dentition intact and Upper dentition intact     Pulmonary  Breath sounds clear to auscultation               Abdominal         Other Findings            Anesthetic Plan    ASA: 1  Anesthesia type: MAC          Induction: Intravenous  Anesthetic plan and risks discussed with: Patient

## 2021-05-07 NOTE — OP NOTES
Operative Note    Patient: Mildred Sanders  YOB: 1960  MRN: [id number]    Date of Procedure: 5/7/2021     Pre-Op Diagnosis: UPPER ARM MASS LEFT    Post-Op Diagnosis: Same as preoperative diagnosis. Procedure(s):  EXCISION OF LEFT UPPER EXTREMITY MASS (MAC/LOCAL)    Surgeon(s):  Jim Hidalgo MD    Surgical Assistant: Surg Asst-1: Curtis TINAJERO    Anesthesia: MAC     Estimated Blood Loss (mL): Minimal    Complications: None    Specimens:   ID Type Source Tests Collected by Time Destination   1 : left arm lipoma Preservative Arm, Left  Jim Hidalgo MD 5/7/2021 [facility]        Implants: * No implants in log *    Drains: * No LDAs found *    Findings: 2 cm lipoma    Electronically Signed by Jacque Gautam MD on 5/7/2021 at 1:48 PM    Indication: See paper history and physical.      Description: The patient was brought to the operating room and underwent successful monitored anesthesia and airway control. All appropriate monitoring devices were placed. The patient was placed in left lateral decubitus position. All pressure points were padded and then the presacral region was prepped and draped in the usual sterile fashion. A time out was completed verifying patient, procedure, site, positioning and any needed special equipment prior to beginning this procedure. Pre-operative antibiotics were administered within 30 minutes of skin incision. A longitudinal 1.5 cm skin incision was made with a knife. This was deepened sharply through subcutaneous tissues. The incision was continued down around the fatty mass. Specimen was passed off for pathologic analysis. Hemostasis was achieved with electrocautery. Wound was irrigated with warmed saline. The adjacent subcutaneous dead space was gently apposed to completely close the dead space with three 2-0 vicryl interrupted sutures. Skin was closed with 4-0 subcuticular stitch.    The patient tolerated the procedure well and was taken to the postanesthesia care unit in stable condition.      Byron Jolly MD

## 2021-05-07 NOTE — BRIEF OP NOTE
Brief Postoperative Note    Patient: Aj Moore  YOB: 1960  MRN: [id number]    Date of Procedure: 5/7/2021     Pre-Op Diagnosis: UPPER ARM MASS LEFT    Post-Op Diagnosis: Same as preoperative diagnosis.       Procedure(s):  EXCISION OF LEFT UPPER EXTREMITY MASS (MAC/LOCAL)    Surgeon(s):  Luis Enrique Martell MD    Surgical Assistant: Surg Asst-1: Morejon Free T    Anesthesia: MAC     Estimated Blood Loss (mL): Minimal    Complications: None    Specimens:   ID Type Source Tests Collected by Time Destination   1 : left arm lipoma Preservative Arm, Left  Luis Enrique Martell MD 5/7/2021 [facility] Pathology        Implants: * No implants in log *    Drains: * No LDAs found *    Findings: 2 cm lipoma    Electronically Signed by Carson Farmer MD on 5/7/2021 at 1:48 PM

## 2022-03-20 PROBLEM — M22.2X1 PATELLOFEMORAL SYNDROME, RIGHT: Status: ACTIVE | Noted: 2020-01-25

## 2022-03-20 PROBLEM — S62.340D: Status: ACTIVE | Noted: 2020-01-25

## 2022-07-12 ENCOUNTER — OFFICE VISIT (OUTPATIENT)
Dept: FAMILY MEDICINE CLINIC | Age: 62
End: 2022-07-12
Payer: MEDICARE

## 2022-07-12 VITALS
DIASTOLIC BLOOD PRESSURE: 76 MMHG | HEIGHT: 67 IN | HEART RATE: 66 BPM | SYSTOLIC BLOOD PRESSURE: 125 MMHG | BODY MASS INDEX: 24.58 KG/M2 | WEIGHT: 156.6 LBS | RESPIRATION RATE: 20 BRPM | OXYGEN SATURATION: 100 % | TEMPERATURE: 99.6 F

## 2022-07-12 DIAGNOSIS — R93.89 ABNORMAL FINDINGS ON DIAGNOSTIC IMAGING OF OTHER SPECIFIED BODY STRUCTURES: ICD-10-CM

## 2022-07-12 DIAGNOSIS — M22.2X1 PATELLOFEMORAL SYNDROME, RIGHT: ICD-10-CM

## 2022-07-12 DIAGNOSIS — R79.9 ABNORMAL FINDING OF BLOOD CHEMISTRY, UNSPECIFIED: ICD-10-CM

## 2022-07-12 DIAGNOSIS — N52.1 ERECTILE DYSFUNCTION DUE TO DISEASES CLASSIFIED ELSEWHERE: ICD-10-CM

## 2022-07-12 DIAGNOSIS — Z12.5 SCREENING FOR PROSTATE CANCER: ICD-10-CM

## 2022-07-12 DIAGNOSIS — G43.011 INTRACTABLE MIGRAINE WITHOUT AURA AND WITH STATUS MIGRAINOSUS: ICD-10-CM

## 2022-07-12 DIAGNOSIS — M54.16 LUMBAR RADICULOPATHY, RIGHT: Primary | ICD-10-CM

## 2022-07-12 DIAGNOSIS — Z12.11 SCREENING FOR COLON CANCER: ICD-10-CM
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
## 2022-07-12 PROCEDURE — 3017F COLORECTAL CA SCREEN DOC REV: CPT | Performed by: FAMILY MEDICINE

## 2022-07-12 PROCEDURE — G8510 SCR DEP NEG, NO PLAN REQD: HCPCS | Performed by: FAMILY MEDICINE

## 2022-07-12 PROCEDURE — G8427 DOCREV CUR MEDS BY ELIG CLIN: HCPCS | Performed by: FAMILY MEDICINE

## 2022-07-12 PROCEDURE — 99204 OFFICE O/P NEW MOD 45 MIN: CPT | Performed by: FAMILY MEDICINE

## 2022-07-12 RX ORDER — SILDENAFIL 100 MG/1
100 TABLET, FILM COATED ORAL
Qty: 6 TABLET | Refills: 4 | Status: SHIPPED | OUTPATIENT
Start: 2022-07-12 | End: 2022-07-12 | Stop reason: SDUPTHER

## 2022-07-12 RX ORDER — SILDENAFIL 100 MG/1
100 TABLET, FILM COATED ORAL
Qty: 6 TABLET | Refills: 4 | Status: SHIPPED | OUTPATIENT
Start: 2022-07-12 | End: 2022-11-02 | Stop reason: SDUPTHER
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
## 2022-07-12 NOTE — PROGRESS NOTES
Chief Complaint   Patient presents with   [facility]   Back pain   1. \"Have you been to the ER, urgent care clinic since your last visit? Hospitalized since your last visit? \" No    2. \"Have you seen or consulted any other health care providers outside of the [address] [place] [address] since your last visit? \" No     3. For patients aged 39-70: Has the patient had a colonoscopy / FIT/ Cologuard? No      If the patient is female:    4. For patients aged 41-77: Has the patient had a mammogram within the past 2 years? NA - based on age or sex      11. For patients aged 21-65: Has the patient had a pap smear?  NA - based on age or sex    Health Maintenance Due   Topic Date Due    Hepatitis C Screening  Never done    Depression Screen  Never done    COVID-19 Vaccine (1) Never done    Colorectal Cancer Screening Combo  Never done    Shingrix Vaccine Age 50> (1 of 2) Never done    Medicare Yearly Exam  Never done
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
## 2022-07-13 LAB
ALBUMIN SERPL-MCNC: 3.8 G/DL (ref 3.5–5)
ALBUMIN/GLOB SERPL: 1.2 {RATIO} (ref 1.1–2.2)
ALP SERPL-CCNC: 85 U/L (ref 45–117)
ALT SERPL-CCNC: 21 U/L (ref 12–78)
ANION GAP SERPL CALC-SCNC: 3 MMOL/L (ref 5–15)
AST SERPL-CCNC: 14 U/L (ref 15–37)
BILIRUB SERPL-MCNC: 0.5 MG/DL (ref 0.2–1)
BUN SERPL-MCNC: 18 MG/DL (ref 6–20)
BUN/CREAT SERPL: 20 (ref 12–20)
CALCIUM SERPL-MCNC: 9.3 MG/DL (ref 8.5–10.1)
CHLORIDE SERPL-SCNC: 107 MMOL/L (ref 97–108)
CHOLEST SERPL-MCNC: 131 MG/DL
CO2 SERPL-SCNC: 29 MMOL/L (ref 21–32)
CREAT SERPL-MCNC: 0.88 MG/DL (ref 0.7–1.3)
CRP SERPL-MCNC: <0.29 MG/DL (ref 0–0.6)
ERYTHROCYTE [DISTWIDTH] IN BLOOD BY AUTOMATED COUNT: 12.5 % (ref 11.5–14.5)
ERYTHROCYTE [SEDIMENTATION RATE] IN BLOOD: 1 MM/HR (ref 0–20)
EST. AVERAGE GLUCOSE BLD GHB EST-MCNC: 105 MG/DL
GLOBULIN SER CALC-MCNC: 3.1 G/DL (ref 2–4)
GLUCOSE SERPL-MCNC: 102 MG/DL (ref 65–100)
HBA1C MFR BLD: 5.3 % (ref 4–5.6)
HCT VFR BLD AUTO: 43.5 % (ref 36.6–50.3)
HDLC SERPL-MCNC: 51 MG/DL
HDLC SERPL: 2.6 {RATIO} (ref 0–5)
HGB BLD-MCNC: 14.3 G/DL (ref 12.1–17)
LDLC SERPL CALC-MCNC: 70.6 MG/DL (ref 0–100)
MCH RBC QN AUTO: 33.8 PG (ref 26–34)
MCHC RBC AUTO-ENTMCNC: 32.9 G/DL (ref 30–36.5)
MCV RBC AUTO: 102.8 FL (ref 80–99)
NRBC # BLD: 0 K/UL (ref 0–0.01)
NRBC BLD-RTO: 0 PER 100 WBC
PLATELET # BLD AUTO: 176 K/UL (ref 150–400)
PMV BLD AUTO: 11.1 FL (ref 8.9–12.9)
POTASSIUM SERPL-SCNC: 4.8 MMOL/L (ref 3.5–5.1)
PROT SERPL-MCNC: 6.9 G/DL (ref 6.4–8.2)
PSA SERPL-MCNC: 1.1 NG/ML (ref 0.01–4)
RBC # BLD AUTO: 4.23 M/UL (ref 4.1–5.7)
SODIUM SERPL-SCNC: 139 MMOL/L (ref 136–145)
TRIGL SERPL-MCNC: 47 MG/DL (ref ?–150)
TSH SERPL DL<=0.05 MIU/L-ACNC: 1.6 UIU/ML (ref 0.36–3.74)
VLDLC SERPL CALC-MCNC: 9.4 MG/DL
WBC # BLD AUTO: 4.9 K/UL (ref 4.1–11.1)

## 2022-07-14 LAB
CENTROMERE B AB SER-ACNC: <0.2 AI (ref 0–0.9)
CHROMATIN AB SERPL-ACNC: <0.2 AI (ref 0–0.9)
DSDNA AB SER-ACNC: 4 IU/ML (ref 0–9)
ENA JO1 AB SER-ACNC: <0.2 AI (ref 0–0.9)
ENA RNP AB SER-ACNC: 0.4 AI (ref 0–0.9)
ENA SCL70 AB SER-ACNC: 0.4 AI (ref 0–0.9)
ENA SM AB SER-ACNC: <0.2 AI (ref 0–0.9)
ENA SM+RNP AB SER-ACNC: <0.2 AI (ref 0–0.9)
ENA SS-A AB SER-ACNC: <0.2 AI (ref 0–0.9)
ENA SS-B AB SER-ACNC: <0.2 AI (ref 0–0.9)
RIBOSOMAL P AB SER-ACNC: <0.2 AI (ref 0–0.9)
SEE BELOW:, 164879: NORMAL
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
## 2022-11-02 ENCOUNTER — OFFICE VISIT (OUTPATIENT)
Dept: FAMILY MEDICINE CLINIC | Age: 62
End: 2022-11-02
Payer: MEDICARE

## 2022-11-02 VITALS
HEIGHT: 67 IN | BODY MASS INDEX: 24.33 KG/M2 | TEMPERATURE: 98.6 F | HEART RATE: 80 BPM | WEIGHT: 155 LBS | RESPIRATION RATE: 18 BRPM | SYSTOLIC BLOOD PRESSURE: 112 MMHG | DIASTOLIC BLOOD PRESSURE: 68 MMHG | OXYGEN SATURATION: 99 %

## 2022-11-02 DIAGNOSIS — N52.1 ERECTILE DYSFUNCTION DUE TO DISEASES CLASSIFIED ELSEWHERE: ICD-10-CM

## 2022-11-02 DIAGNOSIS — M54.16 LUMBAR RADICULOPATHY, RIGHT: ICD-10-CM

## 2022-11-02 DIAGNOSIS — F43.10 POST TRAUMATIC STRESS DISORDER (PTSD): ICD-10-CM

## 2022-11-02 DIAGNOSIS — M54.42 CHRONIC BILATERAL LOW BACK PAIN WITH BILATERAL SCIATICA: Primary | ICD-10-CM
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
## 2022-11-02 DIAGNOSIS — G89.29 CHRONIC BILATERAL LOW BACK PAIN WITH BILATERAL SCIATICA: Primary | ICD-10-CM

## 2022-11-02 DIAGNOSIS — M54.41 CHRONIC BILATERAL LOW BACK PAIN WITH BILATERAL SCIATICA: Primary | ICD-10-CM

## 2022-11-02 PROCEDURE — G8427 DOCREV CUR MEDS BY ELIG CLIN: HCPCS | Performed by: FAMILY MEDICINE

## 2022-11-02 PROCEDURE — G8420 CALC BMI NORM PARAMETERS: HCPCS | Performed by: FAMILY MEDICINE

## 2022-11-02 PROCEDURE — G8510 SCR DEP NEG, NO PLAN REQD: HCPCS | Performed by: FAMILY MEDICINE

## 2022-11-02 PROCEDURE — 99214 OFFICE O/P EST MOD 30 MIN: CPT | Performed by: FAMILY MEDICINE

## 2022-11-02 PROCEDURE — 3017F COLORECTAL CA SCREEN DOC REV: CPT | Performed by: FAMILY MEDICINE

## 2022-11-02 RX ORDER — LIDOCAINE 50 MG/G
1 PATCH TOPICAL EVERY 24 HOURS
Qty: 30 EACH | Refills: 4 | Status: SHIPPED | OUTPATIENT
Start: 2022-11-02

## 2022-11-02 RX ORDER — SILDENAFIL 100 MG/1
100 TABLET, FILM COATED ORAL
Qty: 6 TABLET | Refills: 4 | Status: SHIPPED | OUTPATIENT
Start: 2022-11-02

## 2022-11-02 NOTE — PROGRESS NOTES
Chief Complaint   Patient presents with    Follow-up     Pain throughout body       1. \"Have you been to the ER, urgent care clinic since your last visit? Hospitalized since your last visit? \" No    2. \"Have you seen or consulted any other health care providers outside of the [address] [place] [address] since your last visit? \" No     3. For patients aged 39-70: Has the patient had a colonoscopy / FIT/ Cologuard? No      If the patient is female:    4. For patients aged 41-77: Has the patient had a mammogram within the past 2 years? NA - based on age or sex      11. For patients aged 21-65: Has the patient had a pap smear?  NA - based on age or sex    Health Maintenance Due   Topic Date Due    Hepatitis C Screening  Never done    COVID-19 Vaccine (1) Never done    Colorectal Cancer Screening Combo  Never done    Shingrix Vaccine Age 50> (1 of 2) Never done    Medicare Yearly Exam  Never done    Flu Vaccine (1) Never done
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
## 2022-11-07 ENCOUNTER — TELEPHONE (OUTPATIENT)
Dept: FAMILY MEDICINE CLINIC | Age: 62
End: 2022-11-07

## 2022-11-07 NOTE — TELEPHONE ENCOUNTER
----- Message from Jenna Nuno sent at 11/7/2022  9:02 AM EST -----  Subject: Message to Provider    QUESTIONS  Information for Provider? Patient was returning a call to the PCP. Please   call back. ---------------------------------------------------------------------------  --------------  Alea PIERCE  [phone]; OK to leave message on voicemail  ---------------------------------------------------------------------------  --------------  SCRIPT ANSWERS  Relationship to Patient?  Self

## 2022-11-07 NOTE — TELEPHONE ENCOUNTER
Returned call to pt. Lidocaine prior auth denied,  requesting new med sent to [facility] on file. Call pt when completed.   Message sent to Dr Taniya Rodriguez

## 2022-11-08 ENCOUNTER — DOCUMENTATION ONLY (OUTPATIENT)
Dept: FAMILY MEDICINE CLINIC | Age: 62
End: 2022-11-08

## 2022-11-09 RX ORDER — DICLOFENAC SODIUM 10 MG/G
GEL TOPICAL 4 TIMES DAILY
Qty: 200 G | Refills: 2 | Status: SHIPPED | OUTPATIENT
Start: 2022-11-09

## 2022-11-09 RX ORDER — UBROGEPANT 100 MG/1
TABLET ORAL
COMMUNITY
Start: 2022-11-02

## 2022-11-15 ENCOUNTER — HOSPITAL ENCOUNTER (OUTPATIENT)
Dept: PHYSICAL THERAPY | Age: 62
Discharge: HOME | End: 2022-11-15
Payer: MEDICARE

## 2022-11-15 PROCEDURE — 97162 PT EVAL MOD COMPLEX 30 MIN: CPT
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
## 2022-11-21 ENCOUNTER — APPOINTMENT (OUTPATIENT)
Dept: PHYSICAL THERAPY | Age: 62
End: 2022-11-21
Payer: MEDICARE

## 2022-11-23 ENCOUNTER — TELEPHONE (OUTPATIENT)
Dept: FAMILY MEDICINE CLINIC | Age: 62
End: 2022-11-23

## 2022-11-23 NOTE — TELEPHONE ENCOUNTER
Returned call to [facility],  requesting contact number for pt,  confirmed phone number she has.

## 2022-11-23 NOTE — TELEPHONE ENCOUNTER
----- Message from Khadra Reilly sent at 11/23/2022  9:31 AM EST -----  Subject: Message to Provider    QUESTIONS  Information for Provider? Aby Ivy called from patients insurance company,   they have been trying to reach the patient but have been unsuccessful. They would like to talk to someone and see if there is another number they   can try. She said anyone at the phone number [phone] can help. Please   advise.  ---------------------------------------------------------------------------  --------------  Samia ROMAN  [phone]; OK to leave message on voicemail  ---------------------------------------------------------------------------  --------------  SCRIPT ANSWERS  Relationship to Patient? Third Party  Third Party Type? Insurance? Representative Name?  Aby Cata

## 2022-11-30 ENCOUNTER — TELEPHONE (OUTPATIENT)
Dept: ORTHOPEDIC SURGERY | Age: 62
End: 2022-11-30
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
## 2022-12-07 ENCOUNTER — HOSPITAL ENCOUNTER (OUTPATIENT)
Dept: PHYSICAL THERAPY | Age: 62
Discharge: HOME | End: 2022-12-07
Payer: MEDICARE

## 2022-12-07 PROCEDURE — 97530 THERAPEUTIC ACTIVITIES: CPT

## 2022-12-07 PROCEDURE — 97110 THERAPEUTIC EXERCISES: CPT

## 2022-12-07 NOTE — PROGRESS NOTES
PT DAILY TREATMENT NOTE - [facility] 2-15    Patient Name: Mynor East  Date:2022  : 1960  [x]  Patient  Verified  Payor: Marianna Vaz / Plan: [id number] [employer] / Product Type: Managed Care Medicare /    In time: 1010  Out time: 7806  Total Treatment Time (min): 72  Total Timed Codes (min): 53  1:1 Treatment Time ( only): 48   Visit #: 2    Treatment Area: Low back pain [M54.50]    SUBJECTIVE  Pain Level (0-10 scale): 10 in low back  Any medication changes, allergies to medications, adverse drug reactions, diagnosis change, or new procedure performed?: [x] No    [] Yes (see summary sheet for update)  Subjective functional status/changes:   [] No changes reported    The patient reports he is trying to get an inversion table, however this has been denied by insurance to this point. He has been working on sit to stands at home with moderate success. He has been sleeping poorly due to symptoms and other life events. OBJECTIVE     33 min Therapeutic Exercise:  [x] See flow sheet :   Rationale: increase ROM, increase strength, improve coordination, improve balance, and increase proprioception to improve the patients ability to stand and walk using LRAD    39 min Therapeutic Activity:  []  See flow sheet : Includes time spent reviewing recent events and educating patient regarding pain neuroscience education and central sensitization, with use of \"car alarm\" analogy. Additionally, includes time spent educating patient on role of therapeutic intervention in improving functional tolerance and quality of life, as well as reviewing various medical forms per patient request. Patient verbalizing and demostrating understanding of provided education with 100% accuracy using teach back method.    Rationale: increase ROM, increase strength, improve coordination, improve balance, and increase proprioception  to improve the patients ability to stand and walk using LRAD          With   [x] TE   [x] TA   [] Neuro   [] SC   [] other: Patient Education: [x] Review HEP    [] Progressed/Changed HEP based on:   [] positioning   [] body mechanics   [] transfers   [] heat/ice application    [] other:      Other Objective/Functional Measures: None noted     Pain Level (0-10 scale) post treatment: Patient noting \"I feel better\" post-session today    ASSESSMENT/Changes in Function:   The patient tolerated therapy visit well at this date. He continues to express impact of frustrations and chronic pain related to onset of current condition limiting function. Noted improved ability to tolerate standing exercise progressions; patient demonstrates decreased L hip extension AROM in open chain positions, improved with emphasis on maintaining extended knee throughout resisted hip extensions. Patient noting decrease in familiar symptoms, improved outlook post-session today. Continue to progress as tolerated. Patient will continue to benefit from skilled PT services to modify and progress therapeutic interventions, address functional mobility deficits, address ROM deficits, address strength deficits, analyze and address soft tissue restrictions, analyze and cue movement patterns, analyze and modify body mechanics/ergonomics, assess and modify postural abnormalities, address imbalance/dizziness, and instruct in home and community integration to attain remaining goals. [x]  See Plan of Care  []  See progress note/recertification  []  See Discharge Summary         Progress towards goals / Updated goals:    Short Term Goals: To be accomplished in 6-8 treatments: The patient will demonstrate understanding of and compliance with updated and progressive HEP toward improved participation in physical therapy plan of care. - Progressing              The patient will have completed full lower body strength/postural control assessment to improve direction of physical therapy plan of care.  - Progressing              The patient will demonstrate ability to complete >= 4 repetitions with use of UEs as needed during 30 Second Sit to Stand Test toward improved endurance with transfers. - Progressing  Long Term Goals: To be accomplished in 12-16 treatments: The patient will demonstrate multiplanar bilateral LE strength increased by >= 1/2 MMT grade toward improved endurance with functional activities such as prolonged standing and walking tasks. - Progressing              The patient will demonstrate ability to complete Timed Up and Go Test in <= 13 seconds using LRAD at a level of Mod (I) toward improved community ambulation potential. - Progressing              The patient will demonstrate ability to complete >= 300 feet during Two Minute Walk Test using LRAD at a level of Mod (I) toward improved endurance with community ambulation. - Progressing              The patient will score >= 35 on FOTO to demonstrate significantly improved subjective report of function. - Progressing   Frequency / Duration: Patient to be seen 2 times per week for 12-16 treatments.     PLAN  [x]  Upgrade activities as tolerated     [x]  Continue plan of care  [x]  Update interventions per flow sheet       []  Discharge due to:_  []  Other:_      Donny East, PT, DPT 12/7/2022

## 2022-12-08 ENCOUNTER — OFFICE VISIT (OUTPATIENT)
Dept: FAMILY MEDICINE CLINIC | Age: 62
End: 2022-12-08
Payer: MEDICARE

## 2022-12-08 VITALS
BODY MASS INDEX: 24.96 KG/M2 | DIASTOLIC BLOOD PRESSURE: 80 MMHG | OXYGEN SATURATION: 100 % | SYSTOLIC BLOOD PRESSURE: 152 MMHG | HEIGHT: 67 IN | TEMPERATURE: 99 F | WEIGHT: 159 LBS | HEART RATE: 65 BPM

## 2022-12-08 DIAGNOSIS — R94.6 ABNORMAL RESULTS OF THYROID FUNCTION STUDIES: ICD-10-CM

## 2022-12-08 DIAGNOSIS — M54.42 CHRONIC BILATERAL LOW BACK PAIN WITH BILATERAL SCIATICA: ICD-10-CM

## 2022-12-08 DIAGNOSIS — R79.9 ABNORMAL FINDING OF BLOOD CHEMISTRY, UNSPECIFIED: ICD-10-CM

## 2022-12-08 DIAGNOSIS — G89.29 CHRONIC BILATERAL LOW BACK PAIN WITH BILATERAL SCIATICA: ICD-10-CM

## 2022-12-08 DIAGNOSIS — N42.9 DISORDER OF PROSTATE, UNSPECIFIED: ICD-10-CM

## 2022-12-08 DIAGNOSIS — Z12.11 SCREEN FOR COLON CANCER: ICD-10-CM

## 2022-12-08 DIAGNOSIS — G43.011 INTRACTABLE MIGRAINE WITHOUT AURA AND WITH STATUS MIGRAINOSUS: ICD-10-CM

## 2022-12-08 DIAGNOSIS — Z00.00 INITIAL MEDICARE ANNUAL WELLNESS VISIT: Primary | ICD-10-CM

## 2022-12-08 DIAGNOSIS — Z11.59 NEED FOR HEPATITIS C SCREENING TEST: ICD-10-CM

## 2022-12-08 DIAGNOSIS — Z71.89 ADVANCED DIRECTIVES, COUNSELING/DISCUSSION: ICD-10-CM

## 2022-12-08 DIAGNOSIS — Z87.891 PERSONAL HISTORY OF TOBACCO USE, PRESENTING HAZARDS TO HEALTH: ICD-10-CM

## 2022-12-08 DIAGNOSIS — M54.41 CHRONIC BILATERAL LOW BACK PAIN WITH BILATERAL SCIATICA: ICD-10-CM

## 2022-12-08 PROCEDURE — G8419 CALC BMI OUT NRM PARAM NOF/U: HCPCS | Performed by: FAMILY MEDICINE

## 2022-12-08 PROCEDURE — G0296 VISIT TO DETERM LDCT ELIG: HCPCS | Performed by: FAMILY MEDICINE

## 2022-12-08 PROCEDURE — G0439 PPPS, SUBSEQ VISIT: HCPCS | Performed by: FAMILY MEDICINE

## 2022-12-08 PROCEDURE — G8427 DOCREV CUR MEDS BY ELIG CLIN: HCPCS | Performed by: FAMILY MEDICINE

## 2022-12-08 PROCEDURE — 3017F COLORECTAL CA SCREEN DOC REV: CPT | Performed by: FAMILY MEDICINE

## 2022-12-08 PROCEDURE — G8510 SCR DEP NEG, NO PLAN REQD: HCPCS | Performed by: FAMILY MEDICINE

## 2022-12-08 RX ORDER — LIDOCAINE 50 MG/G
1 PATCH TOPICAL EVERY 24 HOURS
Qty: 30 EACH | Refills: 4 | Status: SHIPPED | OUTPATIENT
Start: 2022-12-08

## 2022-12-08 RX ORDER — DICLOFENAC SODIUM 10 MG/G
GEL TOPICAL 4 TIMES DAILY
Qty: 200 G | Refills: 2 | Status: SHIPPED | OUTPATIENT
Start: 2022-12-08

## 2022-12-08 RX ORDER — UBROGEPANT 100 MG/1
100 TABLET ORAL
Qty: 48 TABLET | Refills: 1 | Status: SHIPPED | OUTPATIENT
Start: 2022-12-08 | End: 2022-12-08

## 2022-12-08 NOTE — ACP (ADVANCE CARE PLANNING)
Advance Care Planning     Advance Care Planning (ACP) Physician/NP/PA Conversation      Date of Conversation: 12/8/2022    Conversation Conducted with:   Patient with Decision Making Capacity, stating that the Other Legally Authorized Decision Maker would be mother as SDM          Patient is on presence of no family member,, stated that the pt wants to be not DNR at this time,  The pt likes to be a full code individual,  The patient states that there is a lot of will to live,      Conversation Outcomes / Follow-Up Plan:   Completed new Advance Directive      Length of ACP Conversation in minutes:  12 minutes        Peg Juan MD
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
## 2022-12-08 NOTE — PATIENT INSTRUCTIONS
Medicare Wellness Visit, Male    The best way to live healthy is to have a lifestyle where you eat a well-balanced diet, exercise regularly, limit alcohol use, and quit all forms of tobacco/nicotine, if applicable. Regular preventive services are another way to keep healthy. Preventive services (vaccines, screening tests, monitoring & exams) can help personalize your care plan, which helps you manage your own care. Screening tests can find health problems at the earliest stages, when they are easiest to treat. [facility] follows the current, evidence-based guidelines published by the [facility] ([facility]) when recommending preventive services for our patients. Because we follow these guidelines, sometimes recommendations change over time as research supports it. (For example, a prostate screening blood test is no longer routinely recommended for men with no symptoms). Of course, you and your doctor may decide to screen more often for some diseases, based on your risk and co-morbidities (chronic disease you are already diagnosed with). Preventive services for you include:  - Medicare offers their members a free annual wellness visit, which is time for you and your primary care provider to discuss and plan for your preventive service needs.  Take advantage of this benefit every year!    -Over the age of 72 should receive the recommended pneumonia vaccines.   -All adults should have a flu vaccine yearly.  -All adults should receive a tetanus vaccine every 10 years.  -Over the age of 48 should receive the shingles vaccines.    -All adults should be screened once for Hepatitis C.  -All adults age 38-68 who are overweight should have a diabetes screening test once every three years.   -Other screening tests & preventive services for persons with diabetes include: an eye exam to screen for diabetic retinopathy, a kidney function test, a foot exam, and stricter control over your cholesterol.   -Cardiovascular screening for adults with routine risk involves an electrocardiogram (ECG) at intervals determined by the provider.     -Colorectal cancer screening should be done for adults age 43-69 with no increased risk factors for colorectal cancer. There are a number of acceptable methods of screening for this type of cancer. Each test has its own benefits and drawbacks. Discuss with your provider what is most appropriate for you during your annual wellness visit. The different tests include: colonoscopy (considered the best screening method), a fecal occult blood test, a fecal DNA test, and sigmoidoscopy.    -Lung cancer screening is recommended annually with a low dose CT scan for adults between age 54 and 68, who have smoked at least 30 pack years (equivalent of 1 pack per day for 30 days), and who is a current smoker or quit less than 15 years ago. -An Abdominal Aortic Aneurysm (AAA) Screening is recommended for men age 73-68 who has ever smoked in their lifetime.      Here is a list of your current Health Maintenance items (your personalized list of preventive services) with a due date:  Health Maintenance Due   Topic Date Due    Hepatitis C Test  Never done    Colorectal Screening  Never done    Shingles Vaccine (2 of 2) 03/28/2019    Annual Well Visit  Never done    Yearly Flu Vaccine (1) Never done

## 2022-12-08 NOTE — PROGRESS NOTES
Chief Complaint   Patient presents with    Follow-up     Follow up     1. \"Have you been to the ER, urgent care clinic since your last visit? Hospitalized since your last visit? \" No    2. \"Have you seen or consulted any other health care providers outside of the [address] [place] [address] since your last visit? \"  [place]      3. For patients aged 39-70: Has the patient had a colonoscopy / FIT/ Cologuard? No      If the patient is female:    4. For patients aged 41-77: Has the patient had a mammogram within the past 2 years? NA - based on age or sex      11. For patients aged 21-65: Has the patient had a pap smear?  NA - based on age or sex

## 2022-12-09 LAB
ALBUMIN SERPL-MCNC: 4.3 G/DL (ref 3.8–4.9)
ALBUMIN/GLOB SERPL: 1.7 {RATIO} (ref 1.2–2.2)
ALP SERPL-CCNC: 93 IU/L (ref 44–121)
ALT SERPL-CCNC: 27 IU/L (ref 0–44)
APPEARANCE UR: CLEAR
AST SERPL-CCNC: 21 IU/L (ref 0–40)
BILIRUB SERPL-MCNC: 0.6 MG/DL (ref 0–1.2)
BILIRUB UR QL STRIP: NEGATIVE
BUN SERPL-MCNC: 14 MG/DL (ref 8–27)
BUN/CREAT SERPL: 15 (ref 10–24)
CALCIUM SERPL-MCNC: 9.3 MG/DL (ref 8.6–10.2)
CHLORIDE SERPL-SCNC: 105 MMOL/L (ref 96–106)
CHOLEST SERPL-MCNC: 162 MG/DL (ref 100–199)
CO2 SERPL-SCNC: 24 MMOL/L (ref 20–29)
COLOR UR: YELLOW
CREAT SERPL-MCNC: 0.91 MG/DL (ref 0.76–1.27)
EGFR: 96 ML/MIN/1.73
ERYTHROCYTE [DISTWIDTH] IN BLOOD BY AUTOMATED COUNT: 11.9 % (ref 11.6–15.4)
EST. AVERAGE GLUCOSE BLD GHB EST-MCNC: 117 MG/DL
GLOBULIN SER CALC-MCNC: 2.5 G/DL (ref 1.5–4.5)
GLUCOSE SERPL-MCNC: 100 MG/DL (ref 70–99)
GLUCOSE UR QL STRIP: NEGATIVE
HBA1C MFR BLD: 5.7 % (ref 4.8–5.6)
HCT VFR BLD AUTO: 41.7 % (ref 37.5–51)
HCV AB S/CO SERPL IA: <0.1 S/CO RATIO (ref 0–0.9)
HDLC SERPL-MCNC: 56 MG/DL
HGB BLD-MCNC: 14.3 G/DL (ref 13–17.7)
HGB UR QL STRIP: NEGATIVE
KETONES UR QL STRIP: NEGATIVE
LDLC SERPL CALC-MCNC: 93 MG/DL (ref 0–99)
LEUKOCYTE ESTERASE UR QL STRIP: NEGATIVE
MCH RBC QN AUTO: 33.4 PG (ref 26.6–33)
MCHC RBC AUTO-ENTMCNC: 34.3 G/DL (ref 31.5–35.7)
MCV RBC AUTO: 97 FL (ref 79–97)
MICRO URNS: NORMAL
MORPHOLOGY BLD-IMP: ABNORMAL
NITRITE UR QL STRIP: NEGATIVE
PH UR STRIP: 7.5 [PH] (ref 5–7.5)
PLATELET # BLD AUTO: 132 X10E3/UL (ref 150–450)
POTASSIUM SERPL-SCNC: 4.5 MMOL/L (ref 3.5–5.2)
PROT SERPL-MCNC: 6.8 G/DL (ref 6–8.5)
PROT UR QL STRIP: NEGATIVE
PSA SERPL-MCNC: 1.7 NG/ML (ref 0–4)
RBC # BLD AUTO: 4.28 X10E6/UL (ref 4.14–5.8)
SODIUM SERPL-SCNC: 141 MMOL/L (ref 134–144)
SP GR UR STRIP: 1.01 (ref 1–1.03)
SPECIMEN STATUS REPORT, ROLRST: NORMAL
TRIGL SERPL-MCNC: 69 MG/DL (ref 0–149)
TSH SERPL DL<=0.005 MIU/L-ACNC: 1.15 UIU/ML (ref 0.45–4.5)
UROBILINOGEN UR STRIP-MCNC: 0.2 MG/DL (ref 0.2–1)
VLDLC SERPL CALC-MCNC: 13 MG/DL (ref 5–40)
WBC # BLD AUTO: 5.1 X10E3/UL (ref 3.4–10.8)

## 2022-12-12 ENCOUNTER — HOSPITAL ENCOUNTER (OUTPATIENT)
Dept: PHYSICAL THERAPY | Age: 62
Discharge: HOME | End: 2022-12-12
Payer: MEDICARE

## 2022-12-12 PROCEDURE — 97110 THERAPEUTIC EXERCISES: CPT

## 2022-12-12 PROCEDURE — 97530 THERAPEUTIC ACTIVITIES: CPT

## 2022-12-16 ENCOUNTER — HOSPITAL ENCOUNTER (OUTPATIENT)
Dept: PHYSICAL THERAPY | Age: 62
Discharge: HOME | End: 2022-12-16
Payer: MEDICARE

## 2022-12-16 PROCEDURE — 97530 THERAPEUTIC ACTIVITIES: CPT

## 2022-12-16 PROCEDURE — 97140 MANUAL THERAPY 1/> REGIONS: CPT

## 2022-12-16 PROCEDURE — 97110 THERAPEUTIC EXERCISES: CPT
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
## 2023-01-03 ENCOUNTER — HOSPITAL ENCOUNTER (OUTPATIENT)
Dept: PHYSICAL THERAPY | Age: 63
Discharge: HOME | End: 2023-01-03
Payer: MEDICARE

## 2023-01-03 PROCEDURE — 97530 THERAPEUTIC ACTIVITIES: CPT

## 2023-01-03 PROCEDURE — 97110 THERAPEUTIC EXERCISES: CPT

## 2023-01-03 NOTE — PROGRESS NOTES
PT DAILY TREATMENT NOTE - [facility] 2-15    Patient Name: Maryellen Patel  Date:1/3/2023  : 1960  [x]  Patient  Verified  Payor: BLUE CROSS MEDICARE / Plan: [id number] [employer] [facility] HMO / Product Type: Managed Care Medicare /    In time: 268  Out time: 9150  Total Treatment Time (min): 57 (patient required restroom break from 2:34pm-2:38pm)  Total Timed Codes (min): 57  1:1 Treatment Time ( only): 57  Visit #: 5    Treatment Area: Low back pain [M54.50]    SUBJECTIVE  Pain Level (0-10 scale): 8.5 in low back  Any medication changes, allergies to medications, adverse drug reactions, diagnosis change, or new procedure performed?: [x] No    [] Yes (see summary sheet for update)  Subjective functional status/changes:   [] No changes reported    The patient reports he has been doing relatively well; his low back was bothering him more yesterday and the day before. He feels like he still struggles to figure out \"what my limitations are\" related to lumbar injections. He has been working on his home exercises regularly, which challenge him well. OBJECTIVE     47 min Therapeutic Exercise:  [x] See flow sheet :    Rationale: increase ROM, increase strength, improve coordination, improve balance, and increase proprioception to improve the patients ability to stand and walk using LRAD    NT min Manual Therapy: Bilateral hip PROM, all planes to tolerance; STM/TPR L hip flexors/hip adductors; hooklying L hip flexor release   Rationale: decrease pain, increase ROM, increase tissue extensibility, decrease trigger points, and increase postural awareness to improve the patients ability to stand and walk using LRAD     10 min Therapeutic Activity:  []  See flow sheet : Includes time spent reviewing recent events and educating patient regarding pain neuroscience education and central sensitization.  Additionally, includes time spent educating patient on role of therapeutic intervention in improving functional tolerance and quality of life. Patient verbalizing and demonstrating understanding of provided education with 100% accuracy using teach back method. Rationale: increase ROM, increase strength, improve coordination, improve balance, and increase proprioception  to improve the patients ability to stand and walk using LRAD          With   [x] TE   [x] TA   [] Neuro   [] SC   [] other: Patient Education: [x] Review HEP    [] Progressed/Changed HEP based on:   [] positioning   [] body mechanics   [] transfers   [] heat/ice application    [] other:      Other Objective/Functional Measures: None noted     Pain Level (0-10 scale) post treatment: Patient noting \"I feel slightly better\" post-session today    ASSESSMENT/Changes in Function:   The patient tolerated therapy visit well at this date. He continues to express impact of frustrations and chronic pain related to onset of current condition limiting function. Continued emphasis on progressing standing exercise interventions to improve functional endurance. Patient demonstrates decreased eccentric control during R > L band-resisted lateral walks due to decreased L single limb stability. Noted improved eccentric control to plinth with emphasis on increasing anterior weight shifting during sit <-> stand transfers from 20\" plinth height. Patient demonstrates mildly improved posterior chain muscle endurance, able to complete two sets of ten double limb heel raises at 1-4 tempo requiring only one brief standing rest break between sets. Noted onset of L anterior hip/thigh discomfort with repetition during L LE step ups to 6\" step, however patient able to decrease UE support at handrails with repetition. Patient noting significant fatigue, mild decrease in familiar symptoms, improved outlook post-session today. Continue to progress as tolerated.   Patient will continue to benefit from skilled PT services to modify and progress therapeutic interventions, address functional mobility deficits, address ROM deficits, address strength deficits, analyze and address soft tissue restrictions, analyze and cue movement patterns, analyze and modify body mechanics/ergonomics, assess and modify postural abnormalities, address imbalance/dizziness, and instruct in home and community integration to attain remaining goals. [x]  See Plan of Care  []  See progress note/recertification  []  See Discharge Summary         Progress towards goals / Updated goals:    Short Term Goals: To be accomplished in 6-8 treatments: The patient will demonstrate understanding of and compliance with updated and progressive HEP toward improved participation in physical therapy plan of care. - Met              The patient will have completed full lower body strength/postural control assessment to improve direction of physical therapy plan of care. - Met              The patient will demonstrate ability to complete >= 4 repetitions with use of UEs as needed during 30 Second Sit to Stand Test toward improved endurance with transfers. - Progressing  Long Term Goals: To be accomplished in 12-16 treatments: The patient will demonstrate multiplanar bilateral LE strength increased by >= 1/2 MMT grade toward improved endurance with functional activities such as prolonged standing and walking tasks. - Progressing              The patient will demonstrate ability to complete Timed Up and Go Test in <= 13 seconds using LRAD at a level of Mod (I) toward improved community ambulation potential. - Progressing              The patient will demonstrate ability to complete >= 300 feet during Two Minute Walk Test using LRAD at a level of Mod (I) toward improved endurance with community ambulation. - Met              The patient will score >= 35 on FOTO to demonstrate significantly improved subjective report of function. - Progressing   Frequency / Duration: Patient to be seen 1-2 times per week for 8-12 treatments.     PLAN  [x] Upgrade activities as tolerated     [x]  Continue plan of care  [x]  Update interventions per flow sheet       []  Discharge due to:_  []  Other:_      Leanna Gibson, PT, DPT 1/3/2023

## 2023-01-06 ENCOUNTER — HOSPITAL ENCOUNTER (OUTPATIENT)
Dept: PHYSICAL THERAPY | Age: 63
Discharge: HOME | End: 2023-01-06
Payer: MEDICARE

## 2023-01-06 PROCEDURE — 97140 MANUAL THERAPY 1/> REGIONS: CPT

## 2023-01-06 PROCEDURE — 97112 NEUROMUSCULAR REEDUCATION: CPT

## 2023-01-06 NOTE — PROGRESS NOTES
PT DAILY TREATMENT NOTE - [facility] 2-15    Patient Name: Kaorlyn Salamanca  Date:2023  : 1960  [x]  Patient  Verified  Payor: BLUE CROSS MEDICARE / Plan: [id number] [employer] HMO / Product Type: Managed Care Medicare /    In time: 8821  Out time: 1054  Total Treatment Time (min): 51  Total Timed Codes (min): 51  1:1 Treatment Time ([facility] only): 34  Visit #: 6    Treatment Area: Low back pain [M54.50]    SUBJECTIVE  Pain Level (0-10 scale): 10 in low back/L groin/thigh  Any medication changes, allergies to medications, adverse drug reactions, diagnosis change, or new procedure performed?: [x] No    [] Yes (see summary sheet for update)  Subjective functional status/changes:   [] No changes reported    The patient reports he has been stiff and sore in his L hip since yesterday; he had to walk home from the pharmacy due to transportation issues two days ago. He did not place his lidocaine patches and wraps this morning, which he feels like affects his symptoms. OBJECTIVE     30 min Therapeutic Exercise:  [x] See flow sheet :    Rationale: increase ROM, increase strength, improve coordination, improve balance, and increase proprioception to improve the patients ability to stand and walk using LRAD    9 min Manual Therapy: Bilateral hip PROM, all planes to tolerance; STM/TPR L hip flexors/hip adductors; hooklying L hip flexor release   Rationale: decrease pain, increase ROM, increase tissue extensibility, decrease trigger points, and increase postural awareness to improve the patients ability to stand and walk using LRAD     NT min Therapeutic Activity:  []  See flow sheet : Includes time spent reviewing recent events and educating patient regarding pain neuroscience education and central sensitization. Additionally, includes time spent educating patient on role of therapeutic intervention in improving functional tolerance and quality of life.  Patient verbalizing and demonstrating understanding of provided education with 100% accuracy using teach back method. Rationale: increase ROM, increase strength, improve coordination, improve balance, and increase proprioception  to improve the patients ability to stand and walk using LRAD    12 min Neuromuscular Re-education:  [x]  See flow sheet :   Rationale: increase ROM, increase strength, improve coordination, improve balance, and increase proprioception  to improve the patients ability to stand and walk using LRAD           With   [x] TE   [] TA   [x] Neuro   [] SC   [] other: Patient Education: [x] Review HEP    [] Progressed/Changed HEP based on:   [] positioning   [] body mechanics   [] transfers   [] heat/ice application    [] other:      Other Objective/Functional Measures: None noted     Pain Level (0-10 scale) post treatment: 9.5 in low back/L hip/thigh. Patient noting \"I feel slightly better\" with ambulation post-session today. ASSESSMENT/Changes in Function:   The patient tolerated therapy visit well at this date. He continues to express impact of frustrations and chronic pain related to onset of current condition limiting function. Continued emphasis on progressing standing exercise interventions to improve functional endurance. Noted minimal onset of L anterior hip/thigh discomfort with repetition during L LE step ups to 6\" step, however patient able to decrease UE support at handrails with repetition; noted increased UE support with introduction of lateral 6\" step ups today. Patient demonstrates decreased bilateral LE clearance, heavy bilateral UE support through [facility] with obstacle course navigation in lateral > forward direction. Patient noting significant fatigue, mild decrease in familiar symptoms, improved outlook post-session today. Continue to progress as tolerated.   Patient will continue to benefit from skilled PT services to modify and progress therapeutic interventions, address functional mobility deficits, address ROM deficits, address strength deficits, analyze and address soft tissue restrictions, analyze and cue movement patterns, analyze and modify body mechanics/ergonomics, assess and modify postural abnormalities, address imbalance/dizziness, and instruct in home and community integration to attain remaining goals. [x]  See Plan of Care  []  See progress note/recertification  []  See Discharge Summary         Progress towards goals / Updated goals:    Short Term Goals: To be accomplished in 6-8 treatments: The patient will demonstrate understanding of and compliance with updated and progressive HEP toward improved participation in physical therapy plan of care. - Met              The patient will have completed full lower body strength/postural control assessment to improve direction of physical therapy plan of care. - Met              The patient will demonstrate ability to complete >= 4 repetitions with use of UEs as needed during 30 Second Sit to Stand Test toward improved endurance with transfers. - Progressing  Long Term Goals: To be accomplished in 12-16 treatments: The patient will demonstrate multiplanar bilateral LE strength increased by >= 1/2 MMT grade toward improved endurance with functional activities such as prolonged standing and walking tasks. - Progressing              The patient will demonstrate ability to complete Timed Up and Go Test in <= 13 seconds using LRAD at a level of Mod (I) toward improved community ambulation potential. - Progressing              The patient will demonstrate ability to complete >= 300 feet during Two Minute Walk Test using LRAD at a level of Mod (I) toward improved endurance with community ambulation. - Met              The patient will score >= 35 on FOTO to demonstrate significantly improved subjective report of function. - Progressing   Frequency / Duration: Patient to be seen 1-2 times per week for 8-12 treatments.     PLAN  [x]  Upgrade activities as tolerated [x]  Continue plan of care  [x]  Update interventions per flow sheet       []  Discharge due to:_  []  Other:_      Mali Cameron, PT, DPT 1/6/2023

## 2023-01-10 ENCOUNTER — HOSPITAL ENCOUNTER (OUTPATIENT)
Dept: PHYSICAL THERAPY | Age: 63
Discharge: HOME | End: 2023-01-10
Payer: MEDICARE

## 2023-01-10 PROCEDURE — 97140 MANUAL THERAPY 1/> REGIONS: CPT

## 2023-01-10 PROCEDURE — 97530 THERAPEUTIC ACTIVITIES: CPT

## 2023-01-10 NOTE — PROGRESS NOTES
PT DAILY TREATMENT NOTE - [facility] 2-15    Patient Name: Mamadou Michael  Date:1/10/2023  : 1960  [x]  Patient  Verified  Payor: BLUE CROSS MEDICARE / Plan: [id number] / Product Type: Managed Care Medicare /    In time: 1200  Out time: 1254  Total Treatment Time (min): 54  Total Timed Codes (min): 54  1:1 Treatment Time ( only): 32  Visit #: 7    Treatment Area: Low back pain [M54.50]    SUBJECTIVE  Pain Level (0-10 scale): 9.5 in low back/L groin/thigh/knee  Any medication changes, allergies to medications, adverse drug reactions, diagnosis change, or new procedure performed?: [x] No    [] Yes (see summary sheet for update)  Subjective functional status/changes:   [] No changes reported    The patient reports he has been \"aching all over\" since yesterday; he notes he felt better after last visit, however has been limping and feeling as though it is difficult to stay upright while walking recently. He tried to perform some exercises over the weekend. He follows up with Dr. Armando Diaz on 23. OBJECTIVE     32 min Therapeutic Exercise:  [x] See flow sheet :    Rationale: increase ROM, increase strength, improve coordination, improve balance, and increase proprioception to improve the patients ability to stand and walk using LRAD    12 min Manual Therapy: L hip PROM, all planes to tolerance; sidelying L hip extension joint mobilization; STM/TPR L hip flexors/hip adductors/QL/posterolateral hip (gluteus court/medius/minimus/piriformis); hooklying L hip flexor release; sidelying L QL MWM (hip hike, 10x)   Rationale: decrease pain, increase ROM, increase tissue extensibility, decrease trigger points, and increase postural awareness to improve the patients ability to stand and walk using LRAD     10 min Therapeutic Activity:  []  See flow sheet : Includes time spent reviewing recent events and educating patient regarding pain neuroscience education and central sensitization.  Additionally, includes time spent educating patient on role of therapeutic intervention in improving functional tolerance and quality of life. Patient verbalizing and demonstrating understanding of provided education with 100% accuracy using teach back method. Rationale: increase ROM, increase strength, improve coordination, improve balance, and increase proprioception  to improve the patients ability to stand and walk using LRAD    NT min Neuromuscular Re-education:  [x]  See flow sheet :   Rationale: increase ROM, increase strength, improve coordination, improve balance, and increase proprioception  to improve the patients ability to stand and walk using LRAD           With   [x] TE   [x] TA   [] Neuro   [] SC   [] other: Patient Education: [x] Review HEP    [] Progressed/Changed HEP based on:   [] positioning   [] body mechanics   [] transfers   [] heat/ice application    [] other:      Other Objective/Functional Measures: None noted     Pain Level (0-10 scale) post treatment: 9.5 in low back/L hip/thigh/knee. Patient noting \"I feel slightly better\" with ambulation post-session today. ASSESSMENT/Changes in Function:   The patient tolerated therapy visit moderately well at this date. He continues to express impact of frustrations and chronic pain related to onset of current condition limiting function. Patient benefits somewhat from manual intervention for symptom management, noting relief with STM/TPR to L hip flexors. Noted decreased tolerance to standing exercise secondary to increased L LE symptoms in weight bearing positions. Continued emphasis on progressing standing exercise interventions to improve functional endurance. Patient noting increase in L medial thigh and knee pain/popping with repetition in stance phase during standing hip abduction/extension. Patient noting significant fatigue, minimal decrease in familiar symptoms, improved outlook post-session today. Continue to progress as tolerated.   Patient will continue to benefit from skilled PT services to modify and progress therapeutic interventions, address functional mobility deficits, address ROM deficits, address strength deficits, analyze and address soft tissue restrictions, analyze and cue movement patterns, analyze and modify body mechanics/ergonomics, assess and modify postural abnormalities, address imbalance/dizziness, and instruct in home and community integration to attain remaining goals. [x]  See Plan of Care  []  See progress note/recertification  []  See Discharge Summary         Progress towards goals / Updated goals:    Short Term Goals: To be accomplished in 6-8 treatments: The patient will demonstrate understanding of and compliance with updated and progressive HEP toward improved participation in physical therapy plan of care. - Met              The patient will have completed full lower body strength/postural control assessment to improve direction of physical therapy plan of care. - Met              The patient will demonstrate ability to complete >= 4 repetitions with use of UEs as needed during 30 Second Sit to Stand Test toward improved endurance with transfers. - Progressing  Long Term Goals: To be accomplished in 12-16 treatments: The patient will demonstrate multiplanar bilateral LE strength increased by >= 1/2 MMT grade toward improved endurance with functional activities such as prolonged standing and walking tasks.  - Progressing              The patient will demonstrate ability to complete Timed Up and Go Test in <= 13 seconds using LRAD at a level of Mod (I) toward improved community ambulation potential. - Progressing              The patient will demonstrate ability to complete >= 300 feet during Two Minute Walk Test using LRAD at a level of Mod (I) toward improved endurance with community ambulation. - Met              The patient will score >= 35 on FOTO to demonstrate significantly improved subjective report of function. - Progressing   Frequency / Duration: Patient to be seen 1-2 times per week for 8-12 treatments.     PLAN  [x]  Upgrade activities as tolerated     [x]  Continue plan of care  [x]  Update interventions per flow sheet       []  Discharge due to:_  []  Other:_      Wayne Ibarra, PT, DPT 1/10/2023

## 2023-01-18 ENCOUNTER — TRANSCRIBE ORDER (OUTPATIENT)
Dept: REGISTRATION | Age: 63
End: 2023-01-18

## 2023-01-18 ENCOUNTER — HOSPITAL ENCOUNTER (OUTPATIENT)
Dept: GENERAL RADIOLOGY | Age: 63
Discharge: HOME | End: 2023-01-18
Payer: MEDICARE

## 2023-01-18 ENCOUNTER — HOSPITAL ENCOUNTER (OUTPATIENT)
Dept: PHYSICAL THERAPY | Age: 63
Discharge: HOME | End: 2023-01-18
Payer: MEDICARE

## 2023-01-18 DIAGNOSIS — M25.562 PAIN IN LEFT KNEE: Primary | ICD-10-CM

## 2023-01-18 DIAGNOSIS — M25.562 PAIN IN LEFT KNEE: ICD-10-CM

## 2023-01-18 PROCEDURE — 97110 THERAPEUTIC EXERCISES: CPT

## 2023-01-18 PROCEDURE — 97140 MANUAL THERAPY 1/> REGIONS: CPT

## 2023-01-18 PROCEDURE — 73560 X-RAY EXAM OF KNEE 1 OR 2: CPT

## 2023-01-18 PROCEDURE — 97530 THERAPEUTIC ACTIVITIES: CPT
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
## 2023-01-25 ENCOUNTER — HOSPITAL ENCOUNTER (OUTPATIENT)
Dept: PHYSICAL THERAPY | Age: 63
Discharge: HOME | End: 2023-01-25
Payer: MEDICARE

## 2023-01-25 PROCEDURE — 97530 THERAPEUTIC ACTIVITIES: CPT

## 2023-01-25 PROCEDURE — 97112 NEUROMUSCULAR REEDUCATION: CPT

## 2023-01-25 NOTE — PROGRESS NOTES
PT DAILY TREATMENT NOTE - [facility] 2-15    Patient Name: Mera Kidney  Date:2023  : 1960  [x]  Patient  Verified  Payor: Dai Cha / Plan: [id number] [employer] HMO / Product Type: Managed Care Medicare /    In time: 1400  Out time: 9544  Total Treatment Time (min): 68  Total Timed Codes (min): 68  1:1 Treatment Time (MC only): 36  Visit #: 9    Treatment Area: Low back pain [M54.50]    SUBJECTIVE  Pain Level (0-10 scale): 9.5 in low back/L groin/thigh/knee  Any medication changes, allergies to medications, adverse drug reactions, diagnosis change, or new procedure performed?: [x] No    [] Yes (see summary sheet for update)  Subjective functional status/changes:   [] No changes reported    The patient reports he had a muscle spasm in his abdomen running from the center to the R last night; it was very hard and painful. He notes this eased up as he relaxed. He has been working regularly on his exercises at home; X-rays of his L knee performed on 23 indicated mild arthritis. OBJECTIVE     41 min Therapeutic Exercise:  [x] See flow sheet :   Rationale: increase ROM, increase strength, improve coordination, improve balance, and increase proprioception to improve the patients ability to stand and walk using LRAD    NT min Manual Therapy: L hip PROM, all planes to tolerance; sidelying L hip extension joint mobilization; STM/TPR L hip flexors/hip adductors/QL/posterolateral hip (gluteus court/medius/minimus/piriformis); hooklying L hip flexor release; sidelying L QL MWM (hip hike, 10x)   Rationale: decrease pain, increase ROM, increase tissue extensibility, decrease trigger points, and increase postural awareness to improve the patients ability to stand and walk using LRAD     15 min Therapeutic Activity:  []  See flow sheet : Includes time spent reviewing recent events and educating patient regarding pain neuroscience education and central sensitization.  Additionally, includes time spent educating patient on role of therapeutic intervention in improving functional tolerance and quality of life, as well as reviewing patient's recent L knee X-rays and educating patient on anatomy/pathophysiology of current condition. Patient verbalizing and demonstrating understanding of provided education with 100% accuracy using teach back method. Rationale: increase ROM, increase strength, improve coordination, improve balance, and increase proprioception  to improve the patients ability to stand and walk using LRAD    12 min Neuromuscular Re-education:  [x]  See flow sheet :   Rationale: increase ROM, increase strength, improve coordination, improve balance, and increase proprioception  to improve the patients ability to stand and walk using LRAD           With   [x] TE   [x] TA   [x] Neuro   [] SC   [] other: Patient Education: [x] Review HEP    [] Progressed/Changed HEP based on:   [] positioning   [] body mechanics   [] transfers   [] heat/ice application    [] other:      Other Objective/Functional Measures: None noted     Pain Level (0-10 scale) post treatment: Patient noting \"I feel slightly better\" post-session today    ASSESSMENT/Changes in Function:   The patient tolerated therapy visit moderately well at this date. He continues to express impact of frustrations and chronic pain related to onset of current condition limiting function. Patient benefits from review of recent imaging to improve understanding of current condition. Noted decreased tolerance to standing exercise secondary to increased L LE symptoms in weight bearing positions. Continued emphasis on progressing standing exercise interventions to improve functional endurance. Noted tendency to rotate toward direction of travel during sidestepping obstacle course navigation; patient demonstrates several instances of lead limb \"catching\" while navigating 12\" hurdles in forward > lateral direction.  Patient noting significant fatigue, slight decrease in familiar symptoms, improved outlook post-session today. Continue to progress as tolerated. Patient will continue to benefit from skilled PT services to modify and progress therapeutic interventions, address functional mobility deficits, address ROM deficits, address strength deficits, analyze and address soft tissue restrictions, analyze and cue movement patterns, analyze and modify body mechanics/ergonomics, assess and modify postural abnormalities, address imbalance/dizziness, and instruct in home and community integration to attain remaining goals. [x]  See Plan of Care  []  See progress note/recertification  []  See Discharge Summary         Progress towards goals / Updated goals:    Short Term Goals: To be accomplished in 6-8 treatments: The patient will demonstrate understanding of and compliance with updated and progressive HEP toward improved participation in physical therapy plan of care. - Met              The patient will have completed full lower body strength/postural control assessment to improve direction of physical therapy plan of care. - Met              The patient will demonstrate ability to complete >= 4 repetitions with use of UEs as needed during 30 Second Sit to Stand Test toward improved endurance with transfers. - Progressing  Long Term Goals: To be accomplished in 12-16 treatments: The patient will demonstrate multiplanar bilateral LE strength increased by >= 1/2 MMT grade toward improved endurance with functional activities such as prolonged standing and walking tasks.  - Progressing              The patient will demonstrate ability to complete Timed Up and Go Test in <= 13 seconds using LRAD at a level of Mod (I) toward improved community ambulation potential. - Progressing              The patient will demonstrate ability to complete >= 300 feet during Two Minute Walk Test using LRAD at a level of Mod (I) toward improved endurance with community ambulation. - Met              The patient will score >= 35 on FOTO to demonstrate significantly improved subjective report of function. - Continue goal   Frequency / Duration: Patient to be seen 1-2 times per week for 4-8 treatments.     PLAN  [x]  Upgrade activities as tolerated     [x]  Continue plan of care  [x]  Update interventions per flow sheet       []  Discharge due to:_  []  Other:_      Bucky Shea, PT, DPT 1/25/2023

## 2023-01-31 ENCOUNTER — HOSPITAL ENCOUNTER (OUTPATIENT)
Dept: PHYSICAL THERAPY | Age: 63
Discharge: HOME | End: 2023-01-31
Payer: MEDICARE

## 2023-01-31 PROCEDURE — 97140 MANUAL THERAPY 1/> REGIONS: CPT

## 2023-01-31 PROCEDURE — 97112 NEUROMUSCULAR REEDUCATION: CPT

## 2023-01-31 NOTE — PROGRESS NOTES
PT DAILY TREATMENT NOTE - [facility] 2-15    Patient Name: Philomena Castaneda  Date:2023  : 1960  [x]  Patient  Verified  Payor: Yesica Coyle / Plan: [id number] [employer] HMO / Product Type: Managed Care Medicare /    In time:   Out time: 110  Total Treatment Time (min): 57 (patient required restroom break from 10:18-10:22am)  Total Timed Codes (min): 57  1:1 Treatment Time ( only): 30  Visit #: 10    Treatment Area: Low back pain [M54.50]    SUBJECTIVE  Pain Level (0-10 scale): 8.5 in low back/L groin/thigh/knee  Any medication changes, allergies to medications, adverse drug reactions, diagnosis change, or new procedure performed?: [x] No    [] Yes (see summary sheet for update)  Subjective functional status/changes:   [] No changes reported    The patient reports he experienced onset of \"numbness\" into both legs and great toes over the weekend; he was concerned over vascular plaque buildup leading to this. He notes he continues to have better and worse days, however feels therapy is helping. OBJECTIVE     34 min Therapeutic Exercise:  [x] See flow sheet :   Rationale: increase ROM, increase strength, improve coordination, improve balance, and increase proprioception to improve the patients ability to stand and walk using LRAD    11 min Manual Therapy: L hip PROM, all planes to tolerance; sidelying L hip extension joint mobilization; STM/TPR L hip flexors/hip adductors/QL/posterolateral hip (gluteus court/medius/minimus/piriformis); hooklying L hip flexor release; sidelying L QL MWM (hip hike, 10x)   Rationale: decrease pain, increase ROM, increase tissue extensibility, decrease trigger points, and increase postural awareness to improve the patients ability to stand and walk using LRAD     NT min Therapeutic Activity:  []  See flow sheet : Includes time spent reviewing recent events and educating patient regarding pain neuroscience education and central sensitization.  Additionally, includes time spent educating patient on role of therapeutic intervention in improving functional tolerance and quality of life, as well as reviewing patient's recent L knee X-rays and educating patient on anatomy/pathophysiology of current condition. Patient verbalizing and demonstrating understanding of provided education with 100% accuracy using teach back method. Rationale: increase ROM, increase strength, improve coordination, improve balance, and increase proprioception  to improve the patients ability to stand and walk using LRAD    12 min Neuromuscular Re-education:  [x]  See flow sheet :   Rationale: increase ROM, increase strength, improve coordination, improve balance, and increase proprioception  to improve the patients ability to stand and walk using LRAD           With   [x] TE   [] TA   [x] Neuro   [] SC   [] other: Patient Education: [x] Review HEP    [] Progressed/Changed HEP based on:   [] positioning   [] body mechanics   [] transfers   [] heat/ice application    [] other:      Other Objective/Functional Measures: None noted     Pain Level (0-10 scale) post treatment: Patient noting \"I feel slightly better\" post-session today    ASSESSMENT/Changes in Function:   The patient tolerated therapy visit well at this date. He continues to express impact of frustrations and chronic pain related to onset of current condition limiting function. Noted improved tolerance to standing exercise and decreased muscle guarding during manual intervention versus at previous visits. Continued emphasis on progressing standing exercise interventions to improve functional endurance. Noted tendency to rotate toward direction of travel during sidestepping obstacle course navigation; patient demonstrates several however decreased instances of lead limb \"catching\" while navigating 12\" hurdles in forward > lateral direction versus at previous visits.  Patient demonstrates knee-dominant movement pattern with decreased hip hinge during cone pick ups with forward mat navigation. Patient noting significant fatigue, slight decrease in familiar symptoms, improved outlook post-session today. Continue to progress as tolerated. Patient will continue to benefit from skilled PT services to modify and progress therapeutic interventions, address functional mobility deficits, address ROM deficits, address strength deficits, analyze and address soft tissue restrictions, analyze and cue movement patterns, analyze and modify body mechanics/ergonomics, assess and modify postural abnormalities, address imbalance/dizziness, and instruct in home and community integration to attain remaining goals. [x]  See Plan of Care  []  See progress note/recertification  []  See Discharge Summary         Progress towards goals / Updated goals:    Short Term Goals: To be accomplished in 6-8 treatments: The patient will demonstrate understanding of and compliance with updated and progressive HEP toward improved participation in physical therapy plan of care. - Met              The patient will have completed full lower body strength/postural control assessment to improve direction of physical therapy plan of care. - Met              The patient will demonstrate ability to complete >= 4 repetitions with use of UEs as needed during 30 Second Sit to Stand Test toward improved endurance with transfers. - Progressing  Long Term Goals: To be accomplished in 12-16 treatments: The patient will demonstrate multiplanar bilateral LE strength increased by >= 1/2 MMT grade toward improved endurance with functional activities such as prolonged standing and walking tasks.  - Progressing              The patient will demonstrate ability to complete Timed Up and Go Test in <= 13 seconds using LRAD at a level of Mod (I) toward improved community ambulation potential. - Progressing              The patient will demonstrate ability to complete >= 300 feet during Two Minute Walk Test using LRAD at a level of Mod (I) toward improved endurance with community ambulation. - Met              The patient will score >= 35 on FOTO to demonstrate significantly improved subjective report of function. - Continue goal   Frequency / Duration: Patient to be seen 1-2 times per week for 4-8 treatments.     PLAN  [x]  Upgrade activities as tolerated     [x]  Continue plan of care  [x]  Update interventions per flow sheet       []  Discharge due to:_  []  Other:_      Shilo Mays PT, DPT 1/31/2023

## 2023-02-02 ENCOUNTER — HOSPITAL ENCOUNTER (OUTPATIENT)
Dept: PHYSICAL THERAPY | Age: 63
Discharge: HOME | End: 2023-02-02
Payer: MEDICARE

## 2023-02-02 PROCEDURE — 97530 THERAPEUTIC ACTIVITIES: CPT

## 2023-02-02 PROCEDURE — 97140 MANUAL THERAPY 1/> REGIONS: CPT

## 2023-02-02 PROCEDURE — 97112 NEUROMUSCULAR REEDUCATION: CPT

## 2023-02-02 PROCEDURE — 97110 THERAPEUTIC EXERCISES: CPT

## 2023-02-02 NOTE — PROGRESS NOTES
PT DAILY TREATMENT NOTE - [facility] 2-15    Patient Name: Joanne Found  Date:2023  : 1960  [x]  Patient  Verified  Payor: [employer]ICARE / Plan: [id number] [employer] HMO / Product Type: Managed Care Medicare /    In time: 620  Out time: 5046  Total Treatment Time (min): 70  Total Timed Codes (min): 70  1:1 Treatment Time ( only): 54  Visit #: 11    Treatment Area: Low back pain [M54.50]    SUBJECTIVE  Pain Level (0-10 scale): 9 in low back/L groin/thigh/knee  Any medication changes, allergies to medications, adverse drug reactions, diagnosis change, or new procedure performed?: [x] No    [] Yes (see summary sheet for update)  Subjective functional status/changes:   [] No changes reported    The patient reports he continues to experience L great toe \"numbness\", as well as \"tightness\" in L groin and knee, \"like I pulled a muscle\". He followed up with spine specialist this morning, who performed low back X-rays; he is unsure of results of this imaging. OBJECTIVE     38 min Therapeutic Exercise:  [x] See flow sheet :   Rationale: increase ROM, increase strength, improve coordination, improve balance, and increase proprioception to improve the patients ability to stand and walk using LRAD    10 min Manual Therapy: L hip PROM, all planes to tolerance; sidelying L hip extension joint mobilization; STM/TPR L hip flexors/hip adductors/QL/posterolateral hip (gluteus court/medius/minimus/piriformis); hooklying L hip flexor release; sidelying L QL MWM (hip hike, 10x); axial L hip joint distraction   Rationale: decrease pain, increase ROM, increase tissue extensibility, decrease trigger points, and increase postural awareness to improve the patients ability to stand and walk using LRAD     10 min Therapeutic Activity:  []  See flow sheet : Includes time spent reviewing recent events and educating patient regarding pain neuroscience education and central sensitization.  Additionally, includes time spent educating patient on role of therapeutic intervention in improving functional tolerance and quality of life, as well as educating patient on anatomy/pathophysiology of current condition. Patient verbalizing and demonstrating understanding of provided education with 100% accuracy using teach back method. Rationale: increase ROM, increase strength, improve coordination, improve balance, and increase proprioception  to improve the patients ability to stand and walk using LRAD    12 min Neuromuscular Re-education:  [x]  See flow sheet :   Rationale: increase ROM, increase strength, improve coordination, improve balance, and increase proprioception  to improve the patients ability to stand and walk using LRAD           With   [x] TE   [x] TA   [x] Neuro   [] SC   [] other: Patient Education: [x] Review HEP    [] Progressed/Changed HEP based on:   [] positioning   [] body mechanics   [] transfers   [] heat/ice application    [] other:      Other Objective/Functional Measures: None noted     Pain Level (0-10 scale) post treatment: 9 in low back/L groin/thigh/knee. Patient noting \"I feel slightly better\" post-session today. ASSESSMENT/Changes in Function:   The patient tolerated therapy visit well at this date. He continues to express impact of frustrations and chronic pain related to onset of current condition limiting function. Noted improved tolerance to standing exercise and decreased muscle guarding during manual intervention versus at previous visits. Continued emphasis on progressing standing exercise interventions to improve functional endurance. Patient reports rapid onset of L LE discomfort and demonstrates increased challenge performing L > R cable-resisted pallof press. Patient demonstrates no noted instances of lead limb \"catching\" while navigating 12\" hurdles versus at previous visits.  Patient demonstrates knee-dominant movement pattern with decreased hip hinge during cone pick ups with forward mat navigation. Noted ability to progress multiplanar 6\" step ups for decreased UE support, patient benefits from faded verbal cues to maintain upright posture with decreased UE support throughout static and dynamic intervention today. Patient noting significant fatigue, slight decrease in familiar symptoms, improved outlook post-session today. Continue to progress as tolerated. Patient will continue to benefit from skilled PT services to modify and progress therapeutic interventions, address functional mobility deficits, address ROM deficits, address strength deficits, analyze and address soft tissue restrictions, analyze and cue movement patterns, analyze and modify body mechanics/ergonomics, assess and modify postural abnormalities, address imbalance/dizziness, and instruct in home and community integration to attain remaining goals. [x]  See Plan of Care  []  See progress note/recertification  []  See Discharge Summary         Progress towards goals / Updated goals:    Short Term Goals: To be accomplished in 6-8 treatments: The patient will demonstrate understanding of and compliance with updated and progressive HEP toward improved participation in physical therapy plan of care. - Met              The patient will have completed full lower body strength/postural control assessment to improve direction of physical therapy plan of care. - Met              The patient will demonstrate ability to complete >= 4 repetitions with use of UEs as needed during 30 Second Sit to Stand Test toward improved endurance with transfers. - Progressing  Long Term Goals: To be accomplished in 12-16 treatments: The patient will demonstrate multiplanar bilateral LE strength increased by >= 1/2 MMT grade toward improved endurance with functional activities such as prolonged standing and walking tasks.  - Progressing              The patient will demonstrate ability to complete Timed Up and Go Test in <= 13 seconds using LRAD at a level of Mod (I) toward improved community ambulation potential. - Progressing              The patient will demonstrate ability to complete >= 300 feet during Two Minute Walk Test using LRAD at a level of Mod (I) toward improved endurance with community ambulation. - Met              The patient will score >= 35 on FOTO to demonstrate significantly improved subjective report of function. - Continue goal   Frequency / Duration: Patient to be seen 1-2 times per week for 4-8 treatments.     PLAN  [x]  Upgrade activities as tolerated     [x]  Continue plan of care  [x]  Update interventions per flow sheet       []  Discharge due to:_  []  Other:_      Shilo Mays PT, DPT 2/2/2023

## 2023-02-15 ENCOUNTER — HOSPITAL ENCOUNTER (OUTPATIENT)
Dept: PHYSICAL THERAPY | Age: 63
Discharge: HOME | End: 2023-02-15
Payer: MEDICARE

## 2023-02-15 PROCEDURE — 97110 THERAPEUTIC EXERCISES: CPT

## 2023-02-15 NOTE — PROGRESS NOTES
PT DAILY TREATMENT NOTE - [facility] 2-15    Patient Name: Win Jones  Date:2/15/2023  : 1960  [x]  Patient  Verified  Payor: BLUE CROSS MEDICARE / Plan: [id number] [employer] HMO / Product Type: Managed Care Medicare /    In time: 15:05  Out time: 15:52   Total Treatment Time (min): 47 minutes  Total Timed Codes (min): 47 minutes  1:1 Treatment Time (MC only): 42 minutes  Visit #: 12    Treatment Area: Low back pain [M54.50]    SUBJECTIVE  Pain Level (0-10 scale): 8.5 in low back/L groin/thigh/knee  Any medication changes, allergies to medications, adverse drug reactions, diagnosis change, or new procedure performed?: [x] No    [] Yes (see summary sheet for update)  Subjective functional status/changes:   [] No changes reported    The patient reports he continues to experience pain that begins in left hip/groin and travels down into medial thigh, knee, and L great toe. Patient explains he is in constant pain everyday all over his body. OBJECTIVE     47 min Therapeutic Exercise:  [x] See flow sheet :   Rationale: increase ROM, increase strength, improve coordination, improve balance, and increase proprioception to improve the patients ability to stand and walk using LRAD    N/a min Manual Therapy: L hip PROM, all planes to tolerance; sidelying L hip extension joint mobilization; STM/TPR L hip flexors/hip adductors/QL/posterolateral hip (gluteus court/medius/minimus/piriformis); hooklying L hip flexor release; sidelying L QL MWM (hip hike, 10x); axial L hip joint distraction   Rationale: decrease pain, increase ROM, increase tissue extensibility, decrease trigger points, and increase postural awareness to improve the patients ability to stand and walk using LRAD          N/a min Therapeutic Activity:  []  See flow sheet : Includes time spent reviewing recent events and educating patient regarding pain neuroscience education and central sensitization.  Additionally, includes time spent educating patient on role of therapeutic intervention in improving functional tolerance and quality of life, as well as educating patient on anatomy/pathophysiology of current condition. Patient verbalizing and demonstrating understanding of provided education with 100% accuracy using teach back method. Rationale: increase ROM, increase strength, improve coordination, improve balance, and increase proprioception  to improve the patients ability to stand and walk using LRAD    N/a min Neuromuscular Re-education:  [x]  See flow sheet :   Rationale: increase ROM, increase strength, improve coordination, improve balance, and increase proprioception  to improve the patients ability to stand and walk using LRAD           With   [x] TE   [x] TA   [x] Neuro   [] SC   [] other: Patient Education: [x] Review HEP    [] Progressed/Changed HEP based on:   [] positioning   [] body mechanics   [] transfers   [] heat/ice application    [] other:      Other Objective/Functional Measures: None noted     Pain Level (0-10 scale) post treatment: 8.5 in low back/L groin/thigh/knee. ASSESSMENT/Changes in Function:   The patient tolerated therapy visit well at this date. Continued emphasis on progressing standing exercise interventions to improve functional endurance, but continues to require frequent seated rest breaks between standing exercises due to rapid onset of lower extremity and low back pain. Patient notes increase difficulty performing lateral step ups leading with L LE > R LE this visit. Noted tendency of patient to perform standing resisted hip extension and abduction with compensatory trunk involvement, but was able to improve proper muscle activation slightly following verbal corrections. Patient noting no increase in familiar symptoms post-session today. Continue to progress as tolerated.  Patient will continue to benefit from skilled PT services to modify and progress therapeutic interventions, address functional mobility deficits, address ROM deficits, address strength deficits, analyze and address soft tissue restrictions, analyze and cue movement patterns, analyze and modify body mechanics/ergonomics, assess and modify postural abnormalities, address imbalance/dizziness, and instruct in home and community integration to attain remaining goals. [x]  See Plan of Care  []  See progress note/recertification  []  See Discharge Summary         Progress towards goals / Updated goals:    Short Term Goals: To be accomplished in 6-8 treatments: The patient will demonstrate understanding of and compliance with updated and progressive HEP toward improved participation in physical therapy plan of care. - Met              The patient will have completed full lower body strength/postural control assessment to improve direction of physical therapy plan of care. - Met              The patient will demonstrate ability to complete >= 4 repetitions with use of UEs as needed during 30 Second Sit to Stand Test toward improved endurance with transfers. - Progressing  Long Term Goals: To be accomplished in 12-16 treatments: The patient will demonstrate multiplanar bilateral LE strength increased by >= 1/2 MMT grade toward improved endurance with functional activities such as prolonged standing and walking tasks.  - Progressing              The patient will demonstrate ability to complete Timed Up and Go Test in <= 13 seconds using LRAD at a level of Mod (I) toward improved community ambulation potential. - Progressing              The patient will demonstrate ability to complete >= 300 feet during Two Minute Walk Test using LRAD at a level of Mod (I) toward improved endurance with community ambulation. - Met              The patient will score >= 35 on FOTO to demonstrate significantly improved subjective report of function. - Continue goal   Frequency / Duration: Patient to be seen 1-2 times per week for 4-8 treatments.     PLAN  [x]  Upgrade activities as tolerated     [x]  Continue plan of care  [x]  Update interventions per flow sheet       []  Discharge due to:_  []  Other:_      Vega Welch, PT, DPT 2/15/2023

## 2023-03-06 ENCOUNTER — HOSPITAL ENCOUNTER (OUTPATIENT)
Dept: PHYSICAL THERAPY | Age: 63
Discharge: HOME | End: 2023-03-06
Payer: MEDICARE

## 2023-03-06 PROCEDURE — 97530 THERAPEUTIC ACTIVITIES: CPT

## 2023-03-06 PROCEDURE — 97110 THERAPEUTIC EXERCISES: CPT
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
## 2023-03-08 ENCOUNTER — HOSPITAL ENCOUNTER (OUTPATIENT)
Dept: PHYSICAL THERAPY | Age: 63
Discharge: HOME | End: 2023-03-08
Payer: MEDICARE

## 2023-03-08 PROCEDURE — 97140 MANUAL THERAPY 1/> REGIONS: CPT

## 2023-03-08 PROCEDURE — 97110 THERAPEUTIC EXERCISES: CPT

## 2023-03-08 PROCEDURE — 97530 THERAPEUTIC ACTIVITIES: CPT

## 2023-03-08 NOTE — PROGRESS NOTES
PT DAILY TREATMENT NOTE - [facility] 2-15    Patient Name: Hallie Altamirano  Date:3/8/2023  : 1960  [x]  Patient  Verified  Payor: BLUE CROSS MEDICARE / Plan: [id number] [employer] / Product Type: Managed Care Medicare /    In time: 8080  Out time: 7004  Total Treatment Time (min): 58  Total Timed Codes (min): 58  1:1 Treatment Time (MC only): 48  Visit #: 14    Treatment Area: Low back pain [M54.50]    SUBJECTIVE  Pain Level (0-10 scale): 10 \"global stiffness\"  Any medication changes, allergies to medications, adverse drug reactions, diagnosis change, or new procedure performed?: [x] No    [] Yes (see summary sheet for update)  Subjective functional status/changes:   [] No changes reported    The patient reports he has been experiencing increased global \"stiffness\" this morning; he reports he continues to have \"good days and bad days\", and his L groin and inner thigh continue to feel like \"I pulled a muscle\". He has been working on home exercises \"when I can, when I feel up to it\". OBJECTIVE      33 min Therapeutic Exercise:  [x] See flow sheet :   Rationale: increase ROM, increase strength, improve coordination, improve balance, and increase proprioception to improve the patients ability to stand and walk using LRAD    15 min Manual Therapy: L hip PROM, all planes to tolerance; sidelying L hip extension joint mobilization; STM/TPR L hip flexors/hip adductors/QL/posterolateral hip (gluteus court/medius/minimus/piriformis); hooklying L hip flexor release; sidelying L QL MWM (hip hike, 10x); axial L hip joint distraction   Rationale: decrease pain, increase ROM, increase tissue extensibility, decrease trigger points, and increase postural awareness to improve the patients ability to stand and walk using LRAD     10 min Therapeutic Activity:  []  See flow sheet : Includes time spent reviewing recent events and educating patient regarding pain neuroscience education and central sensitization. Additionally, includes time spent educating patient on role of therapeutic intervention in improving functional tolerance and quality of life, as well as educating patient on anatomy/pathophysiology of current condition. Patient verbalizing and demonstrating understanding of provided education with 100% accuracy using teach back method. Rationale: increase ROM, increase strength, improve coordination, improve balance, and increase proprioception  to improve the patients ability to stand and walk using LRAD    NT min Neuromuscular Re-education:  [x]  See flow sheet :   Rationale: increase ROM, increase strength, improve coordination, improve balance, and increase proprioception  to improve the patients ability to stand and walk using LRAD           With   [x] TE   [x] TA   [] Neuro   [] SC   [] other: Patient Education: [x] Review HEP    [] Progressed/Changed HEP based on:   [] positioning   [] body mechanics   [] transfers   [] heat/ice application    [] other:      Other Objective/Functional Measures: Patient reports mild decrease in familiar LE symptoms with ambulation post-manual intervention     Pain Level (0-10 scale) post treatment: Patient noting \"I feel slightly better\" with noted improved outlook post-session today    ASSESSMENT/Changes in Function:   The patient tolerated therapy visit well at this date. He continues to express impact of frustrations and chronic pain related to onset of current condition limiting function. Noted improved tolerance to standing exercise and decreased muscle guarding during manual intervention versus at previous visits. Continued emphasis on progressing standing exercise interventions to improve functional endurance. Patient demonstrates increased gait speed, decreased reliance on [facility] for support during mat walking/6\" efe step overs; noting increase in L thigh symptoms with lateral walking through obstacle course.  Noted ability to progress multiplanar 6\" step ups for [phone] decreased UE support, patient benefits from faded verbal cues to maintain upright posture with decreased UE support throughout static and dynamic intervention today. Patient demonstrates decreased anterior weight shifting during progression of sit <-> stands from elevated surface for decreased UE support, improved with repetition and use of \"nose over toes\" verbal cue. Patient noting significant fatigue, slight decrease in familiar symptoms, improved outlook post-session today. Continue to progress as tolerated. Patient will continue to benefit from skilled PT services to modify and progress therapeutic interventions, address functional mobility deficits, address ROM deficits, address strength deficits, analyze and address soft tissue restrictions, analyze and cue movement patterns, analyze and modify body mechanics/ergonomics, assess and modify postural abnormalities, address imbalance/dizziness, and instruct in home and community integration to attain remaining goals. [x]  See Plan of Care  []  See progress note/recertification  []  See Discharge Summary         Progress towards goals / Updated goals:    Short Term Goals: To be accomplished in 6-8 treatments: The patient will demonstrate understanding of and compliance with updated and progressive HEP toward improved participation in physical therapy plan of care. - Met              The patient will have completed full lower body strength/postural control assessment to improve direction of physical therapy plan of care. - Met              The patient will demonstrate ability to complete >= 4 repetitions with use of UEs as needed during 30 Second Sit to Stand Test toward improved endurance with transfers. - Continue goal  Long Term Goals: To be accomplished in 16-20 treatments:                The patient will demonstrate multiplanar bilateral LE strength increased by >= 1/2 MMT grade toward improved endurance with functional activities such as prolonged standing and walking tasks. - Progressing              The patient will demonstrate ability to complete Timed Up and Go Test in <= 13 seconds using LRAD at a level of Mod (I) toward improved community ambulation potential. - Progressing              The patient will demonstrate ability to complete >= 300 feet during Two Minute Walk Test using LRAD at a level of Mod (I) toward improved endurance with community ambulation. - Met              The patient will score >= 35 on FOTO to demonstrate significantly improved subjective report of function. - Discontinue goal due to will not collect outcome measure during subsequent visits secondary to patient's cognitive affective impairments  Frequency / Duration: Patient to be seen 1-2 times per week for 4-8 treatments.     PLAN  [x]  Upgrade activities as tolerated     [x]  Continue plan of care  [x]  Update interventions per flow sheet       []  Discharge due to:_  []  Other:_      Elvira Carnes, PT, DPT 3/8/2023

## 2023-03-15 ENCOUNTER — APPOINTMENT (OUTPATIENT)
Dept: PHYSICAL THERAPY | Age: 63
End: 2023-03-15
Payer: MEDICARE

## 2023-03-17 ENCOUNTER — HOSPITAL ENCOUNTER (OUTPATIENT)
Dept: PHYSICAL THERAPY | Age: 63
Discharge: HOME | End: 2023-03-17
Payer: MEDICARE

## 2023-03-17 PROCEDURE — 97140 MANUAL THERAPY 1/> REGIONS: CPT

## 2023-03-17 PROCEDURE — 97530 THERAPEUTIC ACTIVITIES: CPT

## 2023-03-17 NOTE — PROGRESS NOTES
PT DAILY TREATMENT NOTE - [facility] 2-15    Patient Name: Jennifer Castanon  Date:3/17/2023  : 1960  [x]  Patient  Verified  Payor: BLUE CROSS MEDICARE / Plan: [id number] [employer] / Product Type: Managed Care Medicare /    In time: 8666  Out time: 1144  Total Treatment Time (min): 64 (patient required restroom break from 11:28-11:31am)  Total Timed Codes (min): 64  1:1 Treatment Time ( only): 24  Visit #: 15    Treatment Area: Low back pain [M54.50]    SUBJECTIVE  Pain Level (0-10 scale): 10 \"global stiffness\"  Any medication changes, allergies to medications, adverse drug reactions, diagnosis change, or new procedure performed?: [x] No    [] Yes (see summary sheet for update)  Subjective functional status/changes:   [] No changes reported    The patient reports he was feeling \"too much pain\" last day he was scheduled for PT, which is why he had to cancel; he notes he continues to experience increased L leg pain, however decided to come to appointment today. OBJECTIVE      44 min Therapeutic Exercise:  [x] See flow sheet :   Rationale: increase ROM, increase strength, improve coordination, improve balance, and increase proprioception to improve the patients ability to stand and walk using LRAD    10 min Manual Therapy: L hip PROM, all planes to tolerance; sidelying L hip extension joint mobilization; STM/TPR L hip flexors/hip adductors/QL/posterolateral hip (gluteus court/medius/minimus/piriformis); hooklying L hip flexor release; sidelying L QL MWM (hip hike, 10x); axial L hip joint distraction   Rationale: decrease pain, increase ROM, increase tissue extensibility, decrease trigger points, and increase postural awareness to improve the patients ability to stand and walk using LRAD     10 min Therapeutic Activity:  []  See flow sheet : Includes time spent reviewing recent events and educating patient regarding pain neuroscience education and central sensitization.  Additionally, includes time spent educating patient on role of therapeutic intervention in improving functional tolerance and quality of life, as well as educating patient on anatomy/pathophysiology of current condition. Patient verbalizing and demonstrating understanding of provided education with 100% accuracy using teach back method. Rationale: increase ROM, increase strength, improve coordination, improve balance, and increase proprioception  to improve the patients ability to stand and walk using LRAD    NT min Neuromuscular Re-education:  [x]  See flow sheet :   Rationale: increase ROM, increase strength, improve coordination, improve balance, and increase proprioception  to improve the patients ability to stand and walk using LRAD           With   [x] TE   [x] TA   [] Neuro   [] SC   [] other: Patient Education: [x] Review HEP    [] Progressed/Changed HEP based on:   [] positioning   [] body mechanics   [] transfers   [] heat/ice application    [] other:      Other Objective/Functional Measures: Patient reports mild decrease in familiar LE symptoms with ambulation post-manual intervention     Pain Level (0-10 scale) post treatment: Patient noting \"I feel slightly better\", \"9.45/10\" on VAS in L LE with noted improved outlook post-session today    ASSESSMENT/Changes in Function:   The patient tolerated therapy visit well at this date. He continues to express impact of frustrations and chronic pain related to onset of current condition limiting function. Noted improved tolerance to standing exercise and decreased muscle guarding during manual intervention versus at previous visits, however continues to demonstrate increased L proximal hip adductor turgor. Continued emphasis on progressing standing exercise interventions to improve functional endurance. Patient demonstrates decreased UE support through handrail, improved L LE triple extension strength and power during forward 6\" step ups today.  Patient noting significant fatigue, slight decrease in familiar symptoms, improved outlook post-session today. Continue to progress as tolerated. Patient will continue to benefit from skilled PT services to modify and progress therapeutic interventions, address functional mobility deficits, address ROM deficits, address strength deficits, analyze and address soft tissue restrictions, analyze and cue movement patterns, analyze and modify body mechanics/ergonomics, assess and modify postural abnormalities, address imbalance/dizziness, and instruct in home and community integration to attain remaining goals. [x]  See Plan of Care  []  See progress note/recertification  []  See Discharge Summary         Progress towards goals / Updated goals:    Short Term Goals: To be accomplished in 6-8 treatments: The patient will demonstrate understanding of and compliance with updated and progressive HEP toward improved participation in physical therapy plan of care. - Met              The patient will have completed full lower body strength/postural control assessment to improve direction of physical therapy plan of care. - Met              The patient will demonstrate ability to complete >= 4 repetitions with use of UEs as needed during 30 Second Sit to Stand Test toward improved endurance with transfers. - Continue goal  Long Term Goals: To be accomplished in 16-20 treatments: The patient will demonstrate multiplanar bilateral LE strength increased by >= 1/2 MMT grade toward improved endurance with functional activities such as prolonged standing and walking tasks.  - Progressing              The patient will demonstrate ability to complete Timed Up and Go Test in <= 13 seconds using LRAD at a level of Mod (I) toward improved community ambulation potential. - Progressing              The patient will demonstrate ability to complete >= 300 feet during Two Minute Walk Test using LRAD at a level of Mod (I) toward improved endurance with community ambulation. - Met              The patient will score >= 35 on FOTO to demonstrate significantly improved subjective report of function. - Discontinue goal due to will not collect outcome measure during subsequent visits secondary to patient's cognitive affective impairments  Frequency / Duration: Patient to be seen 1-2 times per week for 4-8 treatments.     PLAN  [x]  Upgrade activities as tolerated     [x]  Continue plan of care  [x]  Update interventions per flow sheet       []  Discharge due to:_  []  Other:_      Eugene Nagy, PT, DPT 3/17/2023

## 2023-08-25 ENCOUNTER — TELEPHONE (OUTPATIENT)
Age: 63
End: 2023-08-25

## 2023-08-25 NOTE — TELEPHONE ENCOUNTER
Patient would like a call from [address] [place] [address] regarding a personal matter he can be reached @ [phone]

## 2023-08-25 NOTE — TELEPHONE ENCOUNTER
Returned call to pt. Stated darin ordered his tens unit but his insurance rejected the claim,  requesting appeal,  informed pt that [facility] will have to file appeal.   Pt upset stating we are just trying to take his money. Again informed pt to contact darin.   Pt stated understanding

## 2023-09-14 ENCOUNTER — OFFICE VISIT (OUTPATIENT)
Age: 63
End: 2023-09-14
Payer: MEDICARE

## 2023-09-14 VITALS
TEMPERATURE: 98.1 F | HEIGHT: 67 IN | BODY MASS INDEX: 24.14 KG/M2 | WEIGHT: 153.8 LBS | OXYGEN SATURATION: 98 % | HEART RATE: 98 BPM | DIASTOLIC BLOOD PRESSURE: 88 MMHG | RESPIRATION RATE: 20 BRPM | SYSTOLIC BLOOD PRESSURE: 134 MMHG

## 2023-09-14 DIAGNOSIS — Z78.9 UNCIRCUMCISED MALE: Primary | ICD-10-CM

## 2023-09-14 DIAGNOSIS — R73.03 PREDIABETES: ICD-10-CM

## 2023-09-14 DIAGNOSIS — D69.6 THROMBOCYTOPENIA, UNSPECIFIED (HCC): ICD-10-CM

## 2023-09-14 PROCEDURE — 99213 OFFICE O/P EST LOW 20 MIN: CPT | Performed by: FAMILY MEDICINE

## 2023-09-14 RX ORDER — PREGABALIN 50 MG/1
50 CAPSULE ORAL 2 TIMES DAILY
COMMUNITY
Start: 2023-02-02

## 2023-09-14 RX ORDER — QUETIAPINE FUMARATE 100 MG/1
TABLET, FILM COATED ORAL
COMMUNITY
Start: 2023-08-22

## 2023-09-14 RX ORDER — SILDENAFIL 100 MG/1
100 TABLET, FILM COATED ORAL DAILY PRN
Qty: 12 TABLET | Refills: 5 | Status: SHIPPED | OUTPATIENT
Start: 2023-09-14

## 2023-09-14 RX ORDER — ESCITALOPRAM OXALATE 5 MG/1
TABLET ORAL
COMMUNITY
Start: 2023-08-22

## 2023-09-14 RX ORDER — OMEPRAZOLE 40 MG/1
CAPSULE, DELAYED RELEASE ORAL
COMMUNITY
Start: 2023-07-21

## 2023-09-14 SDOH — ECONOMIC STABILITY: HOUSING INSECURITY
IN THE LAST 12 MONTHS, WAS THERE A TIME WHEN YOU DID NOT HAVE A STEADY PLACE TO SLEEP OR SLEPT IN A SHELTER (INCLUDING NOW)?: NO

## 2023-09-14 SDOH — ECONOMIC STABILITY: FOOD INSECURITY: WITHIN THE PAST 12 MONTHS, THE FOOD YOU BOUGHT JUST DIDN'T LAST AND YOU DIDN'T HAVE MONEY TO GET MORE.: SOMETIMES TRUE

## 2023-09-14 SDOH — ECONOMIC STABILITY: INCOME INSECURITY: HOW HARD IS IT FOR YOU TO PAY FOR THE VERY BASICS LIKE FOOD, HOUSING, MEDICAL CARE, AND HEATING?: SOMEWHAT HARD

## 2023-09-14 SDOH — ECONOMIC STABILITY: FOOD INSECURITY: WITHIN THE PAST 12 MONTHS, YOU WORRIED THAT YOUR FOOD WOULD RUN OUT BEFORE YOU GOT MONEY TO BUY MORE.: SOMETIMES TRUE

## 2023-09-14 ASSESSMENT — PATIENT HEALTH QUESTIONNAIRE - PHQ9
SUM OF ALL RESPONSES TO PHQ QUESTIONS 1-9: 0
SUM OF ALL RESPONSES TO PHQ QUESTIONS 1-9: 0
2. FEELING DOWN, DEPRESSED OR HOPELESS: 0
1. LITTLE INTEREST OR PLEASURE IN DOING THINGS: 0
SUM OF ALL RESPONSES TO PHQ9 QUESTIONS 1 & 2: 0
SUM OF ALL RESPONSES TO PHQ QUESTIONS 1-9: 0
SUM OF ALL RESPONSES TO PHQ QUESTIONS 1-9: 0

## 2023-09-14 NOTE — PROGRESS NOTES
Chief Complaint   Patient presents with    Discuss Labs     1. Have you been to the ER, urgent care clinic since your last visit? Hospitalized since your last visit? No    2. Have you seen or consulted any other health care providers outside of the [address] [place] [address] since your last visit? Include any pap smears or colon screening.  No

## 2023-09-14 NOTE — PROGRESS NOTES
Uncircumcised male present with complaint stating that he to get circumcised he stated he does not know what to do about it denies any discharge no complaint doing safe sex will like to get circumcised at this time last PSA level was 1.7 patient in stable condition with low platelet of 524, neg hep-c, on antidepressant denies suicidal homicidal ideation    Constitutional: no chills and fever,  , nad     HENT: no ear pain or nosebleeds. No blurred vision  Respiratory: no shortness of breath, wheezing cough   Cardiovascular: Has no chest pain, ,and racing heart . Gastrointestinal: No constipation, diarrhea, nausea and vomiting. Genitourinary: No frequency. Musculoskeletal: Negative for joint pain. Skin: no itching, no rash. Neurological: Negative for dizziness, no tremors  Psychiatric/Behavioral: Negative for depression, is not nervous/anxious. General:  alert cooperative appears stated for the age  HEENT; normocephalic and atraumatic PERRLA extraocular motor intact normal tympanic membrane normal external ear bilaterally, mucosal normal no drainage  Neck: supple no adenopathy no JVD no bruit  Lungs: Clear to auscultation bilaterally no rales rhonchi or wheezes  Heart: Normal regular S1-S2 ,  no murmur  Breast exam deferred  Abdomen: Soft nontender normal bowel sounds   Extremities: Normal range of motion no point tenderness normal pulses no edema  Pelvic/: No lesion, no lymphadenopathy  Skin: Normal no lesion no rash       ASSESSMENT/PLAN:  1. Uncircumcised male  -     Yasmine Gore MD, Urology, [address]  -     Comprehensive Metabolic Panel; Future  -     Hemoglobin A1C; Future  2. Thrombocytopenia, unspecified ([address])  -     CBC with Auto Differential; Future  -     Comprehensive Metabolic Panel; Future  -     Hemoglobin A1C; Future  3. Prediabetes  -     Hemoglobin A1C; Future      Return in about 3 months (around 12/14/2023), or if symptoms worsen or fail to improve.

## 2023-09-15 LAB
ALBUMIN SERPL-MCNC: 4.5 G/DL (ref 3.9–4.9)
ALBUMIN/GLOB SERPL: 1.7 {RATIO} (ref 1.2–2.2)
ALP SERPL-CCNC: 85 IU/L (ref 44–121)
ALT SERPL-CCNC: 15 IU/L (ref 0–44)
AST SERPL-CCNC: 21 IU/L (ref 0–40)
BASOPHILS # BLD AUTO: 0 X10E3/UL (ref 0–0.2)
BASOPHILS NFR BLD AUTO: 1 %
BILIRUB SERPL-MCNC: 0.4 MG/DL (ref 0–1.2)
BUN SERPL-MCNC: 14 MG/DL (ref 8–27)
BUN/CREAT SERPL: 17 (ref 10–24)
CALCIUM SERPL-MCNC: 9.4 MG/DL (ref 8.6–10.2)
CHLORIDE SERPL-SCNC: 105 MMOL/L (ref 96–106)
CO2 SERPL-SCNC: 26 MMOL/L (ref 20–29)
CREAT SERPL-MCNC: 0.84 MG/DL (ref 0.76–1.27)
EGFRCR SERPLBLD CKD-EPI 2021: 99 ML/MIN/1.73
EOSINOPHIL # BLD AUTO: 0.1 X10E3/UL (ref 0–0.4)
EOSINOPHIL NFR BLD AUTO: 1 %
ERYTHROCYTE [DISTWIDTH] IN BLOOD BY AUTOMATED COUNT: 11.4 % (ref 11.6–15.4)
GLOBULIN SER CALC-MCNC: 2.7 G/DL (ref 1.5–4.5)
GLUCOSE SERPL-MCNC: 98 MG/DL (ref 70–99)
HBA1C MFR BLD: 5.4 % (ref 4.8–5.6)
HCT VFR BLD AUTO: 43.5 % (ref 37.5–51)
HGB BLD-MCNC: 14.7 G/DL (ref 13–17.7)
IMM GRANULOCYTES # BLD AUTO: 0 X10E3/UL (ref 0–0.1)
IMM GRANULOCYTES NFR BLD AUTO: 0 %
LYMPHOCYTES # BLD AUTO: 1.6 X10E3/UL (ref 0.7–3.1)
LYMPHOCYTES NFR BLD AUTO: 33 %
MCH RBC QN AUTO: 33.2 PG (ref 26.6–33)
MCHC RBC AUTO-ENTMCNC: 33.8 G/DL (ref 31.5–35.7)
MCV RBC AUTO: 98 FL (ref 79–97)
MONOCYTES # BLD AUTO: 0.5 X10E3/UL (ref 0.1–0.9)
MONOCYTES NFR BLD AUTO: 9 %
MORPHOLOGY BLD-IMP: ABNORMAL
NEUTROPHILS # BLD AUTO: 2.7 X10E3/UL (ref 1.4–7)
NEUTROPHILS NFR BLD AUTO: 56 %
PLATELET # BLD AUTO: 93 X10E3/UL (ref 150–450)
POTASSIUM SERPL-SCNC: 5.2 MMOL/L (ref 3.5–5.2)
PROT SERPL-MCNC: 7.2 G/DL (ref 6–8.5)
RBC # BLD AUTO: 4.43 X10E6/UL (ref 4.14–5.8)
SODIUM SERPL-SCNC: 144 MMOL/L (ref 134–144)
WBC # BLD AUTO: 4.9 X10E3/UL (ref 3.4–10.8)

## 2023-09-21 ENCOUNTER — TELEPHONE (OUTPATIENT)
Age: 63
End: 2023-09-21

## 2023-09-21 NOTE — TELEPHONE ENCOUNTER
Patient states that he was checking on an order for circumcision and also he wants a call about his hemorrhoids he can be reached @ [phone]

## 2023-09-21 NOTE — TELEPHONE ENCOUNTER
Returned call to pt.   Requesting to schedule his \"hemorrhoid removal appointment;\"  informed pt that he will have to contact his gastro md.  Pt stated understanding

## 2025-08-12 ENCOUNTER — TRANSCRIBE ORDERS (OUTPATIENT)
Facility: HOSPITAL | Age: 65
End: 2025-08-12

## 2025-08-12 DIAGNOSIS — R31.0 GROSS HEMATURIA: Primary | ICD-10-CM

## 2025-08-18 ENCOUNTER — HOSPITAL ENCOUNTER (OUTPATIENT)
Facility: HOSPITAL | Age: 65
Discharge: HOME | End: 2025-08-21
Attending: UROLOGY
Payer: MEDICARE

## 2025-08-18 DIAGNOSIS — R31.0 GROSS HEMATURIA: ICD-10-CM

## 2025-08-18 PROCEDURE — 74178 CT ABD&PLV WO CNTR FLWD CNTR: CPT

## 2025-08-18 PROCEDURE — 6360000004 HC RX CONTRAST MEDICATION: Performed by: UROLOGY

## 2025-08-18 RX ORDER — IOPAMIDOL 755 MG/ML
100 INJECTION, SOLUTION INTRAVASCULAR
Status: COMPLETED | OUTPATIENT
Start: 2025-08-18 | End: 2025-08-18

## 2025-08-18 RX ADMIN — IOPAMIDOL 100 ML: 755 INJECTION, SOLUTION INTRAVENOUS at 11:25

## (undated) DEVICE — DERMABOND SKIN ADH 0.7ML -- DERMABOND ADVANCED 12/BX

## (undated) DEVICE — STERILE POLYISOPRENE POWDER-FREE SURGICAL GLOVES: Brand: PROTEXIS

## (undated) DEVICE — TOWEL SURG W17XL27IN STD BLU COT NONFENESTRATED PREWASHED

## (undated) DEVICE — STRAP,POSITIONING,KNEE/BODY,FOAM,4X60": Brand: MEDLINE

## (undated) DEVICE — PREP SKN CHLRAPRP APL 26ML STR --

## (undated) DEVICE — COVER LT HNDL PLAS RIG 1 PER PK

## (undated) DEVICE — SURGICAL PROCEDURE PACK BASIN MAJ SET CUST NO CAUT

## (undated) DEVICE — SYR 10ML LUER LOK 1/5ML GRAD --

## (undated) DEVICE — PAD,NON-ADHERENT,3X8,STERILE,LF,1/PK: Brand: MEDLINE

## (undated) DEVICE — CANISTER, RIGID, 3000CC: Brand: MEDLINE INDUSTRIES, INC.

## (undated) DEVICE — SUTURE MCRYL SZ 4-0 L27IN ABSRB UD L19MM PS-2 1/2 CIR PRIM Y426H

## (undated) DEVICE — SPONGE GZ W4XL4IN COT 12 PLY TYP VII WVN C FLD DSGN

## (undated) DEVICE — SOL IRRIGATION INJ NACL 0.9% 500ML BTL

## (undated) DEVICE — ROCKER SWITCH PENCIL BLADE ELECTRODE, HOLSTER: Brand: EDGE

## (undated) DEVICE — 3M™ TEGADERM™ TRANSPARENT FILM DRESSING FRAME STYLE, 1626W, 4 IN X 4-3/4 IN (10 CM X 12 CM), 50/CT 4CT/CASE: Brand: 3M™ TEGADERM™

## (undated) DEVICE — DEVICE TRNSF SPIK STL 2008S] MICROTEK MEDICAL INC]

## (undated) DEVICE — PACK,LAPAROTOMY,2 REINFORCED GOWNS: Brand: MEDLINE

## (undated) DEVICE — STRIP,CLOSURE,WOUND,MEDI-STRIP,1/2X4: Brand: MEDLINE

## (undated) DEVICE — NEEDLE HYPO 22GA L1.5IN BLK S STL HUB POLYPR SHLD REG BVL

## (undated) DEVICE — REM POLYHESIVE ADULT PATIENT RETURN ELECTRODE: Brand: VALLEYLAB

## (undated) DEVICE — SUTURE VCRL SZ 3-0 L27IN ABSRB UD L26MM SH 1/2 CIR J416H

## (undated) DEVICE — INFECTION CONTROL KIT SYS